# Patient Record
Sex: FEMALE | Race: WHITE | NOT HISPANIC OR LATINO | Employment: FULL TIME | URBAN - METROPOLITAN AREA
[De-identification: names, ages, dates, MRNs, and addresses within clinical notes are randomized per-mention and may not be internally consistent; named-entity substitution may affect disease eponyms.]

---

## 2020-02-24 ENCOUNTER — TRANSCRIBE ORDERS (OUTPATIENT)
Dept: ADMINISTRATIVE | Facility: HOSPITAL | Age: 40
End: 2020-02-24

## 2022-01-28 ENCOUNTER — TELEPHONE (OUTPATIENT)
Dept: OBGYN CLINIC | Facility: HOSPITAL | Age: 42
End: 2022-01-28

## 2022-01-28 NOTE — TELEPHONE ENCOUNTER
I received a Care Request from patient to schedule for:    Where Does it Hurt? My right hand  Are you considering joint replacement? No   Are you seeking a second opinion? No  If yes, who is your doctor? I lvm to cb to schedule

## 2023-03-31 ENCOUNTER — OFFICE VISIT (OUTPATIENT)
Dept: URGENT CARE | Facility: CLINIC | Age: 43
End: 2023-03-31

## 2023-03-31 ENCOUNTER — HOSPITAL ENCOUNTER (EMERGENCY)
Facility: HOSPITAL | Age: 43
Discharge: HOME | End: 2023-03-31
Attending: EMERGENCY MEDICINE | Admitting: EMERGENCY MEDICINE

## 2023-03-31 VITALS
HEART RATE: 69 BPM | DIASTOLIC BLOOD PRESSURE: 88 MMHG | TEMPERATURE: 97.9 F | SYSTOLIC BLOOD PRESSURE: 116 MMHG | RESPIRATION RATE: 14 BRPM | OXYGEN SATURATION: 98 % | WEIGHT: 183 LBS

## 2023-03-31 VITALS
SYSTOLIC BLOOD PRESSURE: 128 MMHG | RESPIRATION RATE: 16 BRPM | TEMPERATURE: 97.6 F | OXYGEN SATURATION: 99 % | WEIGHT: 183 LBS | BODY MASS INDEX: 28.72 KG/M2 | DIASTOLIC BLOOD PRESSURE: 77 MMHG | HEIGHT: 67 IN | HEART RATE: 57 BPM

## 2023-03-31 DIAGNOSIS — R51.9 HEADACHE: ICD-10-CM

## 2023-03-31 DIAGNOSIS — M54.2 NECK PAIN: Primary | ICD-10-CM

## 2023-03-31 LAB
ALBUMIN SERPL BCP-MCNC: 4.2 G/DL (ref 3.5–5)
ALP SERPL-CCNC: 41 U/L (ref 34–104)
ALT SERPL W P-5'-P-CCNC: 30 U/L (ref 7–52)
ANION GAP SERPL CALCULATED.3IONS-SCNC: 7 MMOL/L (ref 4–13)
AST SERPL W P-5'-P-CCNC: 24 U/L (ref 13–39)
BASOPHILS # BLD AUTO: 0.04 THOUSANDS/ÂΜL (ref 0–0.1)
BASOPHILS NFR BLD AUTO: 1 % (ref 0–1)
BILIRUB SERPL-MCNC: 0.53 MG/DL (ref 0.2–1)
BUN SERPL-MCNC: 15 MG/DL (ref 5–25)
CALCIUM SERPL-MCNC: 8.8 MG/DL (ref 8.4–10.2)
CHLORIDE SERPL-SCNC: 106 MMOL/L (ref 96–108)
CO2 SERPL-SCNC: 23 MMOL/L (ref 21–32)
CREAT SERPL-MCNC: 0.89 MG/DL (ref 0.6–1.3)
EOSINOPHIL # BLD AUTO: 0.24 THOUSAND/ÂΜL (ref 0–0.61)
EOSINOPHIL NFR BLD AUTO: 5 % (ref 0–6)
ERYTHROCYTE [DISTWIDTH] IN BLOOD BY AUTOMATED COUNT: 12.5 % (ref 11.6–15.1)
GFR SERPL CREATININE-BSD FRML MDRD: 80 ML/MIN/1.73SQ M
GLUCOSE SERPL-MCNC: 88 MG/DL (ref 65–140)
HCT VFR BLD AUTO: 38.2 % (ref 34.8–46.1)
HGB BLD-MCNC: 12.6 G/DL (ref 11.5–15.4)
IMM GRANULOCYTES # BLD AUTO: 0.01 THOUSAND/UL (ref 0–0.2)
IMM GRANULOCYTES NFR BLD AUTO: 0 % (ref 0–2)
LYMPHOCYTES # BLD AUTO: 1.96 THOUSANDS/ÂΜL (ref 0.6–4.47)
LYMPHOCYTES NFR BLD AUTO: 38 % (ref 14–44)
MCH RBC QN AUTO: 31.7 PG (ref 26.8–34.3)
MCHC RBC AUTO-ENTMCNC: 33 G/DL (ref 31.4–37.4)
MCV RBC AUTO: 96 FL (ref 82–98)
MONOCYTES # BLD AUTO: 0.44 THOUSAND/ÂΜL (ref 0.17–1.22)
MONOCYTES NFR BLD AUTO: 9 % (ref 4–12)
NEUTROPHILS # BLD AUTO: 2.46 THOUSANDS/ÂΜL (ref 1.85–7.62)
NEUTS SEG NFR BLD AUTO: 47 % (ref 43–75)
NRBC BLD AUTO-RTO: 0 /100 WBCS
PLATELET # BLD AUTO: 218 THOUSANDS/UL (ref 149–390)
PMV BLD AUTO: 10.8 FL (ref 8.9–12.7)
POTASSIUM SERPL-SCNC: 4 MMOL/L (ref 3.5–5.3)
PROT SERPL-MCNC: 6.5 G/DL (ref 6.4–8.4)
RBC # BLD AUTO: 3.98 MILLION/UL (ref 3.81–5.12)
SODIUM SERPL-SCNC: 136 MMOL/L (ref 135–147)
WBC # BLD AUTO: 5.15 THOUSAND/UL (ref 4.31–10.16)

## 2023-03-31 RX ORDER — METOCLOPRAMIDE HYDROCHLORIDE 5 MG/ML
10 INJECTION INTRAMUSCULAR; INTRAVENOUS ONCE
Status: COMPLETED | OUTPATIENT
Start: 2023-03-31 | End: 2023-03-31

## 2023-03-31 RX ORDER — ACETAMINOPHEN 325 MG/1
975 TABLET ORAL ONCE
Status: COMPLETED | OUTPATIENT
Start: 2023-03-31 | End: 2023-03-31

## 2023-03-31 RX ORDER — KETOROLAC TROMETHAMINE 30 MG/ML
15 INJECTION, SOLUTION INTRAMUSCULAR; INTRAVENOUS ONCE
Status: DISCONTINUED | OUTPATIENT
Start: 2023-03-31 | End: 2023-03-31 | Stop reason: HOSPADM

## 2023-03-31 RX ORDER — METHOCARBAMOL 500 MG/1
500 TABLET, FILM COATED ORAL ONCE
Status: DISCONTINUED | OUTPATIENT
Start: 2023-03-31 | End: 2023-03-31 | Stop reason: HOSPADM

## 2023-03-31 RX ADMIN — METOCLOPRAMIDE 10 MG: 5 INJECTION, SOLUTION INTRAMUSCULAR; INTRAVENOUS at 18:26

## 2023-03-31 RX ADMIN — ACETAMINOPHEN 975 MG: 325 TABLET, FILM COATED ORAL at 18:26

## 2023-03-31 NOTE — PROGRESS NOTES
[address] [facility]        NAME: Ermias Chin is a 43 y o  female  : 1980    MRN: [id number]  DATE: 2023  TIME: 5:37 PM    Assessment and Plan   Neck pain [M54 2]  1  Neck pain  Transfer to other facility            Patient Instructions   Neck Pain: There is concern as the patient states that her headache and neck pain is an 8/10 in intensity and is unable to move the neck without severe pain  We discussed that she should go to the ED to r/o possible meningitis vs SAH  We discussed this may also be secondary to migraine headache/tension headache vs muscle tension  She is agreeable to this plan and her close friend will drive her to [email or web address]  Follow up with PCP in 3-5 days  Proceed to  ER if symptoms worsen  Chief Complaint     Chief Complaint   Patient presents with   • Headache     Pt presents with headache, stiff neck, nausea; History of Present Illness       The patient is a 70-year-old female who presents today for headache, neck pain, shoulder stiffness and nausea x 3 days  The patient states that her sx began with neck pain/neck tension and shoulder stiffness that has progressed to headache today  She states that the headache has been progressively worsening  She states that the pain in her head and neck is so severe that she is experiencing nausea  She states that the pain is a 8/10  She states that the pain is constant and is a throbbing/aching pain  She states that she has been taking sudafed, Excedrin migraine with no relief  She denies fever, chills  No vomiting  No coughing, sore throat, congestion  No dizziness or weakness  No visual changes  No photophobia/phonophobia  No numbness, weakness or tingling of her upper extremities  No trauma or injury  No confusion, slurred speech, facial drooping  No sick contacts or recent travel  She has never experienced a headache like this before             Review of Systems   Review of Systems   Constitutional: Negative for activity change, appetite change, chills, diaphoresis, fatigue and fever  HENT: Negative for congestion, ear discharge, ear pain, facial swelling, hearing loss, postnasal drip, rhinorrhea, sinus pressure, sinus pain, sore throat, tinnitus, trouble swallowing and voice change  Eyes: Negative for photophobia and visual disturbance  Respiratory: Negative for apnea, cough, chest tightness, shortness of breath, wheezing and stridor  Cardiovascular: Negative for chest pain, palpitations and leg swelling  Gastrointestinal: Positive for nausea  Negative for abdominal distention, abdominal pain, diarrhea and vomiting  Genitourinary: Negative for decreased urine volume  Musculoskeletal: Positive for neck pain  Negative for arthralgias, joint swelling, myalgias and neck stiffness  Skin: Negative for rash  Allergic/Immunologic: Negative for immunocompromised state  Neurological: Positive for headaches  Negative for dizziness, weakness, light-headedness and numbness  Hematological: Negative for adenopathy  Psychiatric/Behavioral: Negative for confusion  Current Medications     No current outpatient medications on file  Current Allergies     Allergies as of 03/31/2023   • (No Known Allergies)            The following portions of the patient's history were reviewed and updated as appropriate: allergies, current medications, past family history, past medical history, past social history, past surgical history and problem list      Past Medical History:   Diagnosis Date   • Autonomic neuropathy        Past Surgical History:   Procedure Laterality Date   • CHOLECYSTECTOMY     • OVARIAN CYST REMOVAL         History reviewed  No pertinent family history  Medications have been verified  Objective   /88   Pulse 69   Temp 97 9 °F (36 6 °C)   Resp 14   Wt 83 kg (183 lb)   LMP 03/31/2023   SpO2 98%   Patient's last menstrual period was 03/31/2023         Physical Exam Physical Exam  Vitals and nursing note reviewed  Constitutional:       General: She is not in acute distress  Appearance: She is well-developed  She is not diaphoretic  HENT:      Head: Normocephalic and atraumatic  Right Ear: Hearing, tympanic membrane, ear canal and external ear normal       Left Ear: Hearing, tympanic membrane, ear canal and external ear normal       Nose: Nose normal  No mucosal edema or rhinorrhea  Right Sinus: No maxillary sinus tenderness or frontal sinus tenderness  Left Sinus: No maxillary sinus tenderness or frontal sinus tenderness  Mouth/Throat:      Pharynx: Uvula midline  No oropharyngeal exudate, posterior oropharyngeal erythema or uvula swelling  Tonsils: No tonsillar abscesses  Eyes:      General: Lids are normal  Vision grossly intact  Gaze aligned appropriately  Extraocular Movements: Extraocular movements intact  Right eye: Normal extraocular motion and no nystagmus  Left eye: Normal extraocular motion and no nystagmus  Conjunctiva/sclera: Conjunctivae normal       Pupils: Pupils are equal, round, and reactive to light  Pupils are equal    Neck:      Meningeal: Brudzinski's sign and Kernig's sign absent  Comments: Cervical spine: There is bilateral paraspinal muscle tenderness and spinal tenderness on exam today  She is unable to flex her neck down to her chest and is tearful when she attempts to move the neck in any capacity  There is no crepitus or edema/erythema  Cardiovascular:      Rate and Rhythm: Normal rate and regular rhythm  Heart sounds: Normal heart sounds, S1 normal and S2 normal  Heart sounds not distant  No murmur heard  No friction rub  No gallop  No S3 or S4 sounds  Pulmonary:      Effort: Pulmonary effort is normal  No tachypnea, bradypnea, accessory muscle usage or respiratory distress  Breath sounds: Normal breath sounds and air entry   No decreased breath sounds, wheezing, rhonchi or rales  Musculoskeletal:      Cervical back: Rigidity present  No edema, erythema, signs of trauma or crepitus  Pain with movement, spinous process tenderness and muscular tenderness present  Decreased range of motion  Lymphadenopathy:      Cervical: No cervical adenopathy  Neurological:      General: No focal deficit present  Mental Status: She is alert and oriented to person, place, and time  Cranial Nerves: Cranial nerves 2-12 are intact  No cranial nerve deficit or facial asymmetry  Sensory: Sensation is intact  Motor: Motor function is intact  No weakness  Coordination: Coordination is intact  Gait: Gait is intact  Gait normal       Deep Tendon Reflexes: Reflexes are normal and symmetric     Psychiatric:         Behavior: Behavior normal

## 2023-03-31 NOTE — ED PROVIDER NOTES
History  Chief Complaint   Patient presents with   • Neck Pain   • Headache     Pt states she woke up with headache and  neck stiffness this am and a dry throat   Took motrin with no relief no fevers  Went to primary doctor and they sent here to rule out meningitis  HPI  Patient is a 42-year-old female presenting for evaluation of headache, neck pain, neck and shoulder stiffness, bilateral leg myalgias  Patient states that she has felt a bit tense over the course of the past few days however states when she woke up she had a headache which is moderate eventually and has progressively worsened and is now severe  Patient states some associated nausea, denies vomiting  Patient denies photophobia, phonophobia, focal weakness or numbness  Patient denies history of trauma  Patient denies confusion  Patient denies fevers, chills, rigors, constitutional symptoms  Patient denies any recent travel or sick contacts  Patient denies history of migraines  None       Past Medical History:   Diagnosis Date   • Autonomic neuropathy        Past Surgical History:   Procedure Laterality Date   • CHOLECYSTECTOMY     • OVARIAN CYST REMOVAL         History reviewed  No pertinent family history  I have reviewed and agree with the history as documented  E-Cigarette/Vaping   • E-Cigarette Use Never User      E-Cigarette/Vaping Substances     Social History     Tobacco Use   • Smoking status: Former     Types: Cigarettes     Passive exposure: Past   • Smokeless tobacco: Never   Vaping Use   • Vaping Use: Never used   Substance Use Topics   • Alcohol use: No   • Drug use: No       Review of Systems   Constitutional: Negative for chills and fever  HENT: Negative for sore throat and trouble swallowing  Gastrointestinal: Negative for nausea and vomiting  Musculoskeletal: Positive for neck stiffness  Negative for arthralgias and myalgias  Neurological: Positive for headaches         Physical Exam  Physical Exam  Vitals reviewed  Constitutional:       General: She is not in acute distress  Appearance: She is well-developed  She is not diaphoretic  Comments: Anxious appearing but nontoxic nondistressed   HENT:      Head: Normocephalic and atraumatic  Comments: Patient with tension in her trapezius and cervical region however is able to turn head to the left into the right and touch her chin to her chest with pain  Right Ear: External ear normal       Left Ear: External ear normal       Nose: Nose normal       Mouth/Throat:      Pharynx: No oropharyngeal exudate  Eyes:      Pupils: Pupils are equal, round, and reactive to light  Cardiovascular:      Rate and Rhythm: Normal rate and regular rhythm  Heart sounds: Normal heart sounds  No murmur heard  No friction rub  No gallop  Comments: Regular rate and rhythm, no murmurs rubs or gallops  Extremities warm and well-perfused without mottling  Pulmonary:      Effort: Pulmonary effort is normal  No respiratory distress  Breath sounds: Normal breath sounds  No wheezing  Comments: No increased work of breathing  Speaking complete sentences  Lungs clear to auscultation bilaterally without wheezes, rales, rhonchi  Chest:      Chest wall: No tenderness  Abdominal:      General: Bowel sounds are normal  There is no distension  Palpations: Abdomen is soft  There is no mass  Tenderness: There is no abdominal tenderness  There is no guarding  Musculoskeletal:         General: No deformity  Normal range of motion  Cervical back: Normal range of motion  Lymphadenopathy:      Cervical: No cervical adenopathy  Skin:     General: Skin is warm and dry  Capillary Refill: Capillary refill takes less than 2 seconds  Neurological:      Mental Status: She is alert and oriented to person, place, and time  Comments: AAOx3  Cranial nerves II through XII intact    Full strength and sensation bilaterally in upper and lower extremities           Vital Signs  ED Triage Vitals [03/31/23 1742]   Temperature Pulse Respirations Blood Pressure SpO2   97 6 °F (36 4 °C) 57 16 128/77 99 %      Temp Source Heart Rate Source Patient Position - Orthostatic VS BP Location FiO2 (%)   Tympanic -- -- -- --      Pain Score       8           Vitals:    03/31/23 1742   BP: 128/77   Pulse: 57         Visual Acuity  Visual Acuity    Flowsheet Row Most Recent Value   L Pupil Size (mm) 2   R Pupil Size (mm) 2          ED Medications  Medications   metoclopramide (REGLAN) injection 10 mg (10 mg Intravenous Given 3/31/23 1826)   acetaminophen (TYLENOL) tablet 975 mg (975 mg Oral Given 3/31/23 1826)       Diagnostic Studies  Results Reviewed     Procedure Component Value Units Date/Time    Comprehensive metabolic panel [[id number]] Collected: 03/31/23 1825    Lab Status: Final result Specimen: Blood from Arm, Right Updated: 03/31/23 1854     Sodium 136 mmol/L      Potassium 4 0 mmol/L      Chloride 106 mmol/L      CO2 23 mmol/L      ANION GAP 7 mmol/L      BUN 15 mg/dL      Creatinine 0 89 mg/dL      Glucose 88 mg/dL      Calcium 8 8 mg/dL      AST 24 U/L      ALT 30 U/L      Alkaline Phosphatase 41 U/L      Total Protein 6 5 g/dL      Albumin 4 2 g/dL      Total Bilirubin 0 53 mg/dL      eGFR 80 ml/min/1 73sq m     Narrative:      [place] guidelines for Chronic Kidney Disease (CKD):   •  Stage 1 with normal or high GFR (GFR > 90 mL/min/1 73 square meters)  •  Stage 2 Mild CKD (GFR = 60-89 mL/min/1 73 square meters)  •  Stage 3A Moderate CKD (GFR = 45-59 mL/min/1 73 square meters)  •  Stage 3B Moderate CKD (GFR = 30-44 mL/min/1 73 square meters)  •  Stage 4 Severe CKD (GFR = 15-29 mL/min/1 73 square meters)  •  Stage 5 End Stage CKD (GFR <15 mL/min/1 73 square meters)  Note: GFR calculation is accurate only with a steady state creatinine    CBC and differential [[id number]] Collected: 03/31/23 1825    Lab Status: Final result Specimen: Blood from Arm, Right Updated: 03/31/23 1839     WBC 5 15 Thousand/uL      RBC 3 98 Million/uL      Hemoglobin 12 6 g/dL      Hematocrit 38 2 %      MCV 96 fL      MCH 31 7 pg      MCHC 33 0 g/dL      RDW 12 5 %      MPV 10 8 fL      Platelets 919 Thousands/uL      nRBC 0 /100 WBCs      Neutrophils Relative 47 %      Immat GRANS % 0 %      Lymphocytes Relative 38 %      Monocytes Relative 9 %      Eosinophils Relative 5 %      Basophils Relative 1 %      Neutrophils Absolute 2 46 Thousands/µL      Immature Grans Absolute 0 01 Thousand/uL      Lymphocytes Absolute 1 96 Thousands/µL      Monocytes Absolute 0 44 Thousand/µL      Eosinophils Absolute 0 24 Thousand/µL      Basophils Absolute 0 04 Thousands/µL                  No orders to display              Procedures  Procedures         ED Course                               SBIRT 22yo+    Flowsheet Row Most Recent Value   SBIRT (25 yo +)    In order to provide better care to our patients, we are screening all of our patients for alcohol and drug use  Would it be okay to ask you these screening questions? No Filed at: 03/31/2023 1827                    Medical Decision Making  I obtained history from the patient  I reviewed external medical documentation including an urgent care note referring the patient to the emergency department for evaluation of meningitis  Patient well-appearing with no infectious symptoms and a nonfocal neuro exam however does have some neck tightness  Discussed differential diagnosis with patient including viral meningitis, bacterial meningitis, subarachnoid hemorrhage with meningeal irritation  Informed patient that she would likely require a lumbar puncture to fully evaluate these conditions and that imaging and labs alone would not be sufficient to rule this out  Patient understanding this however stating that she does not want a lumbar puncture and understands the risks of leaving without this performed    Patient alert, oriented, "consentable, and understanding risks including permanent disability, permanent pain, death  Patient's partner at bedside for this conversation and both understanding this  Patient electing to leave against medical advice  Patient urged to return to the emergency department if she has any continued or worsening symptoms  Amount and/or Complexity of Data Reviewed  Labs: ordered  Risk  OTC drugs  Prescription drug management  Disposition  Final diagnoses:   Neck pain   Headache     Time reflects when diagnosis was documented in both MDM as applicable and the Disposition within this note     Time User Action Codes Description Comment    3/31/2023  6:51 PM Fernando Dorsey Add [M54 2] Neck pain     3/31/2023  6:51 PM Fernando Dorsey Add [R51 9] Headache       ED Disposition     ED Disposition   AMA    Condition   --    Date/Time   Fri Mar 31, 2023  6:52 PM    Comment   Date: 3/31/2023  Patient: Cliff Singh  Admitted: 3/31/2023  5:45 PM  Attending Provider: David Zamarripa MD    Cliff Singh or her authorized caregiver has made the decision for the patient to leave the emergency department against the ad vice of the emergency department staff  She or her authorized caregiver has been informed and understands the inherent risks, including death, permanent neurologic damage, chronic pain, disability  She or her authorized caregiver has decided to acce pt the responsibility for this decision  Cliff Singh and all necessary parties have been advised that she may return for further evaluation or treatment  Her condition at time of discharge was good  Cliff Singh had current vital signs as fol lows:  /77   Pulse 57   Temp 97 6 °F (36 4 °C) (Tympanic)   Resp 16   Ht 5' 7\" (1 702 m)   Wt 83 kg (183 lb)   LMP 03/31/2023            Follow-up Information    None         There are no discharge medications for this patient  No discharge procedures on file      PDMP " Review     None          ED Provider  Electronically Signed by           Ruth Yun MD  03/31/23 0423

## 2023-05-11 ENCOUNTER — OFFICE VISIT (OUTPATIENT)
Dept: FAMILY MEDICINE CLINIC | Facility: CLINIC | Age: 43
End: 2023-05-11

## 2023-05-11 VITALS
BODY MASS INDEX: 27.28 KG/M2 | SYSTOLIC BLOOD PRESSURE: 100 MMHG | RESPIRATION RATE: 16 BRPM | WEIGHT: 173.8 LBS | TEMPERATURE: 97.1 F | OXYGEN SATURATION: 97 % | DIASTOLIC BLOOD PRESSURE: 70 MMHG | HEIGHT: 67 IN | HEART RATE: 66 BPM

## 2023-05-11 DIAGNOSIS — F41.1 GAD (GENERALIZED ANXIETY DISORDER): ICD-10-CM

## 2023-05-11 DIAGNOSIS — Z12.31 ENCOUNTER FOR SCREENING MAMMOGRAM FOR MALIGNANT NEOPLASM OF BREAST: ICD-10-CM

## 2023-05-11 DIAGNOSIS — F33.9 RECURRENT DEPRESSION (HCC): Primary | ICD-10-CM

## 2023-05-11 DIAGNOSIS — Z12.4 SCREENING FOR CERVICAL CANCER: ICD-10-CM

## 2023-05-11 PROBLEM — Z76.89 ESTABLISHING CARE WITH NEW DOCTOR, ENCOUNTER FOR: Status: RESOLVED | Noted: 2022-12-08 | Resolved: 2023-05-11

## 2023-05-11 PROBLEM — M54.2 NECK PAIN: Status: RESOLVED | Noted: 2023-03-31 | Resolved: 2023-05-11

## 2023-05-11 PROBLEM — Z76.89 ESTABLISHING CARE WITH NEW DOCTOR, ENCOUNTER FOR: Status: ACTIVE | Noted: 2022-12-08

## 2023-05-11 RX ORDER — BUSPIRONE HYDROCHLORIDE 5 MG/1
5 TABLET ORAL 2 TIMES DAILY
Qty: 60 TABLET | Refills: 0 | Status: SHIPPED | OUTPATIENT
Start: 2023-05-11 | End: 2023-06-10

## 2023-05-11 RX ORDER — BUPROPION HYDROCHLORIDE 150 MG/1
150 TABLET ORAL EVERY MORNING
Qty: 30 TABLET | Refills: 0 | Status: SHIPPED | OUTPATIENT
Start: 2023-05-11 | End: 2023-06-10

## 2023-05-11 NOTE — ASSESSMENT & PLAN NOTE
She has had depression/anxiety for years  Has tried SSRIs, Wellbutrin, Buspar, but never took medications long enough to notice any benefit  She would like to try again and actually stick with it  She has been trying to lose weight, so would like to go with Wellbutrin at this time  Restart Wellbutrin and Buspar  Referrals provided to psych and therapy     F/u in 1 month

## 2023-05-11 NOTE — PROGRESS NOTES
Name: Coley Phalen      : 1980      MRN: [id number]  Encounter Provider: Saba Christopher MD  Encounter Date: 2023   Encounter department: [address] [place] [address]     1  Recurrent depression ([facility] [address] )  Assessment & Plan:  She has had depression/anxiety for years  Has tried SSRIs, Wellbutrin, Buspar, but never took medications long enough to notice any benefit  She would like to try again and actually stick with it  She has been trying to lose weight, so would like to go with Wellbutrin at this time  Restart Wellbutrin and Buspar  Referrals provided to psych and therapy  F/u in 1 month     Orders:  -     buPROPion (WELLBUTRIN XL) 150 mg 24 hr tablet; Take 1 tablet (150 mg total) by mouth every morning  -     [facility]; Future  -     Ambulatory Referral to Psychiatry; Future    2  Screening for cervical cancer  -     Ambulatory referral to Obstetrics / Gynecology; Future    3  Encounter for screening mammogram for malignant neoplasm of breast  -     Mammo screening bilateral w 3d & cad; Future; Expected date: 2023    4  ERI (generalized anxiety disorder)  Assessment & Plan:  She has had depression/anxiety for years  Has tried SSRIs, Wellbutrin, Buspar, but never took medications long enough to notice any benefit  She would like to try again and actually stick with it  She has been trying to lose weight, so would like to go with Wellbutrin at this time  Restart Wellbutrin and Buspar  Referrals provided to psych and therapy  F/u in 1 month     Orders:  -     buPROPion (WELLBUTRIN XL) 150 mg 24 hr tablet; Take 1 tablet (150 mg total) by mouth every morning  -     busPIRone (BUSPAR) 5 mg tablet; Take 1 tablet (5 mg total) by mouth 2 (two) times a day  -     [facility]; Future  -     Ambulatory Referral to Psychiatry; Future    BMI Counseling: Body mass index is 27 22 kg/m²  Discussed with patient's BMI with her  The BMI is above normal  Nutrition recommendations include reducing portion sizes, decreasing overall calorie intake, 3-5 servings of fruits/vegetables daily, reducing fast food intake and consuming healthier snacks  Exercise recommendations include moderate aerobic physical activity for 150 minutes/week  Subjective      HPI   She is here as a new patient to establish care and to discuss depression and anxiety  PHQ-2/9 Depression Screening    Little interest or pleasure in doing things: 3 - nearly every day  Feeling down, depressed, or hopeless: 3 - nearly every day  Trouble falling or staying asleep, or sleeping too much: 3 - nearly every day  Feeling tired or having little energy: 3 - nearly every day  Poor appetite or overeatin - not at all  Feeling bad about yourself - or that you are a failure or have let yourself or your family down: 2 - more than half the days  Trouble concentrating on things, such as reading the newspaper or watching television: 3 - nearly every day  Moving or speaking so slowly that other people could have noticed  Or the opposite - being so fidgety or restless that you have been moving around a lot more than usual: 0 - not at all  Thoughts that you would be better off dead, or of hurting yourself in some way: 0 - not at all  PHQ-2 Score: 6  PHQ-2 Interpretation: POSITIVE depression screen  PHQ-9 Score: 17   PHQ-9 Interpretation: Moderately severe depression        ERI-7 Flowsheet Screening    Flowsheet Row Most Recent Value   Over the last 2 weeks, how often have you been bothered by any of the following problems?     Feeling nervous, anxious, or on edge 3   Not being able to stop or control worrying 3   Worrying too much about different things 3   Trouble relaxing 3   Being so restless that it is hard to sit still 0   Becoming easily annoyed or irritable 1   Feeling afraid as if something awful might happen 2   ERI-7 Total Score 15        Review of Systems "  Constitutional: Negative  HENT: Negative  Eyes: Negative  Respiratory: Negative  Cardiovascular: Negative  Gastrointestinal: Negative  Endocrine: Negative  Genitourinary: Negative  Musculoskeletal: Negative  Skin: Negative  Allergic/Immunologic: Negative  Neurological: Negative  Hematological: Negative  Psychiatric/Behavioral: Positive for decreased concentration, dysphoric mood and sleep disturbance  Negative for self-injury and suicidal ideas  The patient is nervous/anxious  No current outpatient medications on file prior to visit  Objective     /70   Pulse 66   Temp (!) 97 1 °F (36 2 °C) (Tympanic)   Resp 16   Ht 5' 7\" (1 702 m)   Wt 78 8 kg (173 lb 12 8 oz)   LMP 04/27/2023 (Approximate)   SpO2 97%   BMI 27 22 kg/m²     Physical Exam  Constitutional:       General: She is not in acute distress  Appearance: She is well-developed  She is not diaphoretic  HENT:      Head: Normocephalic and atraumatic  Right Ear: Tympanic membrane, ear canal and external ear normal  There is no impacted cerumen  Left Ear: Tympanic membrane, ear canal and external ear normal  There is no impacted cerumen  Nose: Nose normal  No congestion or rhinorrhea  Mouth/Throat:      Mouth: Mucous membranes are moist       Pharynx: Oropharynx is clear  No oropharyngeal exudate or posterior oropharyngeal erythema  Eyes:      General: No scleral icterus  Right eye: No discharge  Left eye: No discharge  Conjunctiva/sclera: Conjunctivae normal    Cardiovascular:      Rate and Rhythm: Normal rate and regular rhythm  Heart sounds: Normal heart sounds  No murmur heard  No friction rub  No gallop  Pulmonary:      Effort: Pulmonary effort is normal  No respiratory distress  Breath sounds: Normal breath sounds  No wheezing or rales  Chest:      Chest wall: No tenderness  Musculoskeletal:         General: No deformity   " Normal range of motion  Cervical back: Normal range of motion and neck supple  Skin:     General: Skin is warm and dry  Neurological:      Mental Status: She is alert and oriented to person, place, and time  Psychiatric:         Behavior: Behavior normal          Thought Content:  Thought content normal          Judgment: Judgment normal        Lisseth Minaya MD

## 2023-05-16 ENCOUNTER — TELEPHONE (OUTPATIENT)
Dept: PSYCHIATRY | Facility: CLINIC | Age: 43
End: 2023-05-16

## 2023-05-16 NOTE — TELEPHONE ENCOUNTER
Called and lvm asking patient to call our office    We have received a referral from her PCP for therapy and would like to schedule an apt

## 2023-05-22 ENCOUNTER — TELEPHONE (OUTPATIENT)
Dept: PSYCHIATRY | Facility: CLINIC | Age: 43
End: 2023-05-22

## 2023-06-01 DIAGNOSIS — F41.1 GAD (GENERALIZED ANXIETY DISORDER): ICD-10-CM

## 2023-06-01 DIAGNOSIS — F33.9 RECURRENT DEPRESSION (HCC): ICD-10-CM

## 2023-06-01 RX ORDER — BUSPIRONE HYDROCHLORIDE 5 MG/1
TABLET ORAL
Qty: 60 TABLET | Refills: 0 | Status: SHIPPED | OUTPATIENT
Start: 2023-06-01

## 2023-06-01 RX ORDER — BUPROPION HYDROCHLORIDE 150 MG/1
TABLET ORAL
Qty: 30 TABLET | Refills: 0 | Status: SHIPPED | OUTPATIENT
Start: 2023-06-01

## 2023-07-02 DIAGNOSIS — F41.1 GAD (GENERALIZED ANXIETY DISORDER): ICD-10-CM

## 2023-07-02 DIAGNOSIS — F33.9 RECURRENT DEPRESSION (HCC): ICD-10-CM

## 2023-07-03 RX ORDER — BUPROPION HYDROCHLORIDE 150 MG/1
TABLET ORAL
Qty: 30 TABLET | Refills: 0 | Status: SHIPPED | OUTPATIENT
Start: 2023-07-03

## 2023-07-04 DIAGNOSIS — F41.1 GAD (GENERALIZED ANXIETY DISORDER): ICD-10-CM

## 2023-07-05 RX ORDER — BUSPIRONE HYDROCHLORIDE 5 MG/1
TABLET ORAL
Qty: 60 TABLET | Refills: 0 | Status: SHIPPED | OUTPATIENT
Start: 2023-07-05

## 2023-08-08 DIAGNOSIS — F41.1 GAD (GENERALIZED ANXIETY DISORDER): ICD-10-CM

## 2023-08-08 DIAGNOSIS — F33.9 RECURRENT DEPRESSION (HCC): ICD-10-CM

## 2023-08-08 RX ORDER — BUPROPION HYDROCHLORIDE 150 MG/1
TABLET ORAL
Qty: 30 TABLET | Refills: 0 | Status: SHIPPED | OUTPATIENT
Start: 2023-08-08

## 2023-08-08 RX ORDER — BUSPIRONE HYDROCHLORIDE 5 MG/1
TABLET ORAL
Qty: 60 TABLET | Refills: 0 | Status: SHIPPED | OUTPATIENT
Start: 2023-08-08

## 2023-09-13 ENCOUNTER — OFFICE VISIT (OUTPATIENT)
Dept: FAMILY MEDICINE CLINIC | Facility: CLINIC | Age: 43
End: 2023-09-13
Payer: COMMERCIAL

## 2023-09-13 VITALS
DIASTOLIC BLOOD PRESSURE: 80 MMHG | TEMPERATURE: 96.8 F | HEIGHT: 67 IN | SYSTOLIC BLOOD PRESSURE: 118 MMHG | WEIGHT: 141.2 LBS | HEART RATE: 72 BPM | BODY MASS INDEX: 22.16 KG/M2 | RESPIRATION RATE: 17 BRPM

## 2023-09-13 DIAGNOSIS — F41.1 GAD (GENERALIZED ANXIETY DISORDER): Primary | ICD-10-CM

## 2023-09-13 DIAGNOSIS — F33.9 RECURRENT DEPRESSION (HCC): ICD-10-CM

## 2023-09-13 PROCEDURE — 99214 OFFICE O/P EST MOD 30 MIN: CPT | Performed by: FAMILY MEDICINE

## 2023-09-13 RX ORDER — PAROXETINE HYDROCHLORIDE 20 MG/1
20 TABLET, FILM COATED ORAL DAILY
Qty: 30 TABLET | Refills: 1 | Status: SHIPPED | OUTPATIENT
Start: 2023-09-13

## 2023-09-13 RX ORDER — HYDROXYZINE HYDROCHLORIDE 25 MG/1
25 TABLET, FILM COATED ORAL EVERY 6 HOURS PRN
Qty: 30 TABLET | Refills: 0 | Status: SHIPPED | OUTPATIENT
Start: 2023-09-13

## 2023-09-13 RX ORDER — BUSPIRONE HYDROCHLORIDE 10 MG/1
10 TABLET ORAL 3 TIMES DAILY
Qty: 90 TABLET | Refills: 1 | Status: SHIPPED | OUTPATIENT
Start: 2023-09-13 | End: 2023-11-12

## 2023-09-13 NOTE — PROGRESS NOTES
Name: Sammy Huddleston      : 1980      MRN: [id number]  Encounter Provider: Nikki Christine MD  Encounter Date: 2023   Encounter department: [facility]     1. ERI (generalized anxiety disorder)  -     PARoxetine (PAXIL) 20 mg tablet; Take 1 tablet (20 mg total) by mouth daily  -     busPIRone (BUSPAR) 10 mg tablet; Take 1 tablet (10 mg total) by mouth 3 (three) times a day  -     hydrOXYzine HCL (ATARAX) 25 mg tablet; Take 1 tablet (25 mg total) by mouth every 6 (six) hours as needed for anxiety    2. Recurrent depression (HCC)  -     PARoxetine (PAXIL) 20 mg tablet; Take 1 tablet (20 mg total) by mouth daily    She has had depression/anxiety for years. Has tried SSRIs, Wellbutrin, Buspar, but never took medications long enough to notice any benefit. Most recently however was on Wellbutrin and Buspar which she did take for 2 months, but did not work. She would like to try something different. Both her parents have used Paxil which has helped them. Will start Paxil today and increase dose of Buspar to 10mg TID. Atarax as needed for panic attacks. Subjective      HPI   She is here today for follow up of depression and anxiety. ERI-7 Flowsheet Screening    Flowsheet Row Most Recent Value   Over the last 2 weeks, how often have you been bothered by any of the following problems?     Feeling nervous, anxious, or on edge 3   Not being able to stop or control worrying 3   Worrying too much about different things 3   Trouble relaxing 2   Being so restless that it is hard to sit still 0   Becoming easily annoyed or irritable 3   Feeling afraid as if something awful might happen 1   ERI-7 Total Score 15        PHQ-2/9 Depression Screening    Little interest or pleasure in doing things: 3 - nearly every day  Feeling down, depressed, or hopeless: 3 - nearly every day  Trouble falling or staying asleep, or sleeping too much: 2 - more than half the days  Feeling tired or having little energy: 3 - nearly every day  Poor appetite or overeatin - not at all  Feeling bad about yourself - or that you are a failure or have let yourself or your family down: 3 - nearly every day  Trouble concentrating on things, such as reading the newspaper or watching television: 3 - nearly every day  Moving or speaking so slowly that other people could have noticed. Or the opposite - being so fidgety or restless that you have been moving around a lot more than usual: 0 - not at all  Thoughts that you would be better off dead, or of hurting yourself in some way: 0 - not at all  PHQ-9 Score: 17   PHQ-9 Interpretation: Moderately severe depression        Review of Systems   Constitutional: Positive for fatigue. HENT: Negative. Eyes: Negative. Respiratory: Negative. Cardiovascular: Negative. Gastrointestinal: Negative. Endocrine: Negative. Genitourinary: Negative. Musculoskeletal: Negative. Skin: Negative. Allergic/Immunologic: Negative. Neurological: Negative. Hematological: Negative. Psychiatric/Behavioral: Positive for decreased concentration and sleep disturbance. Negative for suicidal ideas. The patient is nervous/anxious. Current Outpatient Medications on File Prior to Visit   Medication Sig   • [DISCONTINUED] buPROPion (WELLBUTRIN XL) 150 mg 24 hr tablet take 1 tablet by mouth every morning   • [DISCONTINUED] busPIRone (BUSPAR) 5 mg tablet take 1 tablet by mouth twice a day       Objective     /80   Pulse 72   Temp (!) 96.8 °F (36 °C)   Resp 17   Ht 5' 7" (1.702 m)   Wt 64 kg (141 lb 3.2 oz)   LMP 2023 (Exact Date)   BMI 22.12 kg/m²     Physical Exam  Constitutional:       General: She is not in acute distress. Appearance: She is well-developed. She is not diaphoretic. HENT:      Head: Normocephalic and atraumatic. Eyes:      General: No scleral icterus. Right eye: No discharge. Left eye: No discharge. Conjunctiva/sclera: Conjunctivae normal.   Pulmonary:      Effort: Pulmonary effort is normal.   Musculoskeletal:      Cervical back: Normal range of motion. Skin:     General: Skin is warm. Neurological:      Mental Status: She is alert and oriented to person, place, and time. Psychiatric:         Behavior: Behavior normal.         Thought Content:  Thought content normal.         Judgment: Judgment normal.       Jareth Uribe MD

## 2023-09-29 ENCOUNTER — OFFICE VISIT (OUTPATIENT)
Dept: URGENT CARE | Facility: CLINIC | Age: 43
End: 2023-09-29
Payer: COMMERCIAL

## 2023-09-29 VITALS
WEIGHT: 140 LBS | HEIGHT: 67 IN | HEART RATE: 79 BPM | SYSTOLIC BLOOD PRESSURE: 112 MMHG | OXYGEN SATURATION: 99 % | BODY MASS INDEX: 21.97 KG/M2 | TEMPERATURE: 98.4 F | RESPIRATION RATE: 16 BRPM | DIASTOLIC BLOOD PRESSURE: 58 MMHG

## 2023-09-29 DIAGNOSIS — H66.002 NON-RECURRENT ACUTE SUPPURATIVE OTITIS MEDIA OF LEFT EAR WITHOUT SPONTANEOUS RUPTURE OF TYMPANIC MEMBRANE: Primary | ICD-10-CM

## 2023-09-29 DIAGNOSIS — J00 ACUTE NASOPHARYNGITIS: ICD-10-CM

## 2023-09-29 PROCEDURE — 99213 OFFICE O/P EST LOW 20 MIN: CPT | Performed by: PHYSICIAN ASSISTANT

## 2023-09-29 RX ORDER — AZITHROMYCIN 250 MG/1
TABLET, FILM COATED ORAL
Qty: 6 TABLET | Refills: 0 | Status: SHIPPED | OUTPATIENT
Start: 2023-09-29 | End: 2023-10-03

## 2023-09-29 NOTE — PROGRESS NOTES
[facility]        NAME: Jaycee Ordonez is a 37 y.o. female  : 1980    MRN: [id number]  DATE: 2023  TIME: 4:51 PM    Assessment and Plan   Non-recurrent acute suppurative otitis media of left ear without spontaneous rupture of tympanic membrane [H66.002]  1. Non-recurrent acute suppurative otitis media of left ear without spontaneous rupture of tympanic membrane  azithromycin (ZITHROMAX) 250 mg tablet      2. Acute nasopharyngitis              Patient Instructions     1. Over-the-counter ibuprofen and/or acetaminophen as needed for pain or fever. 2. Oxymetazoline nasal spray 2 sprays in each nostril every 12 hours for no more than the next 5 days as needed for nasal congestion. 3. Over-the-counter guaifenesin as needed for mucus relief. 4. Gargle salt water as needed for sore throat relief. 5. Increase oral fluid consumption. 6. Follow-up with primary care provider in 7 days for any persistent symptoms. Chief Complaint     Chief Complaint   Patient presents with   • Earache     Left sided ear pain started today. History of Present Illness       80-year-old female patient with a 1 week history of nasal congestion, sore throat, painful swallowing, rhinorrhea. Patient denies any fever or chills. Minimal cough. No GI symptoms. Patient states over the last 2 to 3 days she has been developing worsening left-sided ear pain, decreased hearing. She denies any bleeding or drainage from the ear. No right-sided ear symptoms. Review of Systems   Review of Systems   Constitutional: Negative for chills, fatigue and fever. HENT: Positive for congestion, ear pain, hearing loss, rhinorrhea, sore throat and trouble swallowing. Negative for facial swelling and sinus pain. Respiratory: Negative for cough. Cardiovascular: Negative for chest pain. Gastrointestinal: Negative for abdominal pain. Musculoskeletal: Negative for myalgias. Skin: Negative for rash. Current Medications       Current Outpatient Medications:   •  azithromycin (ZITHROMAX) 250 mg tablet, Take 2 tablets today then 1 tablet daily x 4 days, Disp: 6 tablet, Rfl: 0  •  busPIRone (BUSPAR) 10 mg tablet, Take 1 tablet (10 mg total) by mouth 3 (three) times a day, Disp: 90 tablet, Rfl: 1  •  hydrOXYzine HCL (ATARAX) 25 mg tablet, Take 1 tablet (25 mg total) by mouth every 6 (six) hours as needed for anxiety, Disp: 30 tablet, Rfl: 0  •  PARoxetine (PAXIL) 20 mg tablet, Take 1 tablet (20 mg total) by mouth daily, Disp: 30 tablet, Rfl: 1    Current Allergies     Allergies as of 09/29/2023   • (No Known Allergies)            The following portions of the patient's history were reviewed and updated as appropriate: allergies, current medications, past family history, past medical history, past social history, past surgical history and problem list.     Past Medical History:   Diagnosis Date   • Autonomic neuropathy        Past Surgical History:   Procedure Laterality Date   • CHOLECYSTECTOMY     • OVARIAN CYST REMOVAL         History reviewed. No pertinent family history. Medications have been verified. Objective   /58   Pulse 79   Temp 98.4 °F (36.9 °C)   Resp 16   Ht 5' 7" (1.702 m)   Wt 63.5 kg (140 lb)   LMP 08/26/2023 (Exact Date)   SpO2 99%   BMI 21.93 kg/m²        Physical Exam     Physical Exam  Vitals and nursing note reviewed. Constitutional:       General: She is not in acute distress. Appearance: Normal appearance. She is not ill-appearing or toxic-appearing. HENT:      Head: Normocephalic. Right Ear: Tympanic membrane normal.      Ears:      Comments: Left TM is erythematous, bulging, injected, with a mild to moderate purulent middle ear effusion notable. No perforation seen. Nose: Congestion present.       Mouth/Throat:      Mouth: Mucous membranes are moist.   Eyes:      Conjunctiva/sclera: Conjunctivae normal.      Pupils: Pupils are equal, round, and reactive to light. Cardiovascular:      Rate and Rhythm: Normal rate and regular rhythm. Pulses: Normal pulses. Pulmonary:      Effort: Pulmonary effort is normal.      Breath sounds: Normal breath sounds. Abdominal:      Tenderness: There is no abdominal tenderness. Musculoskeletal:         General: Normal range of motion. Cervical back: Normal range of motion. Skin:     General: Skin is warm and dry. Capillary Refill: Capillary refill takes less than 2 seconds. Neurological:      Mental Status: She is alert and oriented to person, place, and time.    Psychiatric:         Mood and Affect: Mood normal.         Behavior: Behavior normal.

## 2023-10-04 ENCOUNTER — OFFICE VISIT (OUTPATIENT)
Dept: URGENT CARE | Facility: CLINIC | Age: 43
End: 2023-10-04
Payer: COMMERCIAL

## 2023-10-04 VITALS
OXYGEN SATURATION: 99 % | DIASTOLIC BLOOD PRESSURE: 80 MMHG | SYSTOLIC BLOOD PRESSURE: 110 MMHG | WEIGHT: 140 LBS | HEART RATE: 76 BPM | TEMPERATURE: 97.4 F | RESPIRATION RATE: 17 BRPM | HEIGHT: 67 IN | BODY MASS INDEX: 21.97 KG/M2

## 2023-10-04 DIAGNOSIS — H69.92 DYSFUNCTION OF LEFT EUSTACHIAN TUBE: ICD-10-CM

## 2023-10-04 DIAGNOSIS — J00 ACUTE NASOPHARYNGITIS: Primary | ICD-10-CM

## 2023-10-04 PROCEDURE — 99213 OFFICE O/P EST LOW 20 MIN: CPT | Performed by: PHYSICIAN ASSISTANT

## 2023-10-04 RX ORDER — PREDNISONE 20 MG/1
TABLET ORAL
Qty: 15 TABLET | Refills: 0 | Status: SHIPPED | OUTPATIENT
Start: 2023-10-04

## 2023-10-04 NOTE — PROGRESS NOTES
[facility]        NAME: Ky Barger is a 37 y.o. female  : 1980    MRN: [id number]  DATE: 2023  TIME: 3:55 PM    Assessment and Plan   Acute nasopharyngitis [J00]  1. Acute nasopharyngitis        2. Dysfunction of left eustachian tube  predniSONE 20 mg tablet        Based on history and exam I suspect persistent mild left eustachian tube dysfunction and will treat with nasal spray decongestants, eustachian tube exercises, steroids. The rest of the symptoms I suspect are persistent due to ongoing viral URI. Patient Instructions     1. Over-the-counter ibuprofen and/or acetaminophen as needed for pain or fever. 2. Oxymetazoline nasal spray 2 sprays in each nostril every 12 hours for no more than the next 5 days as needed for nasal congestion. 3. Over-the-counter guaifenesin as needed for mucus relief. 4. Gargle salt water as needed for sore throat relief. 5. Increase oral fluid consumption. 6.  Perform eustachian tube exercises as directed frequently but gently throughout the course of the day. 7. Follow-up with primary care provider in 7 days for any persistent symptoms. Chief Complaint     Chief Complaint   Patient presents with   • Sore Throat     Cough, sore throat and bilateral ear congestion since last week. Was at [address] [place] [address] last week for the same complaint. Given Zpack  and able to consumed as prescribed but the symptoms still persist  Tested Negative for covid yesterday using the home kit test         History of Present Illness       80-year-old female patient who presents for follow-up of her visit here on 2023. Patient states that her ear pain has improved but she still unable to normal hearing from the left ear and has intermittent pressure. No bleeding or drainage. Patient also states that she continues with sore throat, mild nasal congestion, mild cough and fatigue. No GI symptoms. No persistent fevers since last visit.   No shortness of breath or chest pain. Review of Systems   Review of Systems   Constitutional: Negative for fever. HENT: Positive for congestion, ear pain and sore throat. Negative for facial swelling, rhinorrhea and sinus pain. Respiratory: Positive for cough. Cardiovascular: Negative for chest pain. Gastrointestinal: Negative for abdominal pain. Musculoskeletal: Negative for myalgias. Skin: Negative for rash. Current Medications       Current Outpatient Medications:   •  busPIRone (BUSPAR) 10 mg tablet, Take 1 tablet (10 mg total) by mouth 3 (three) times a day, Disp: 90 tablet, Rfl: 1  •  hydrOXYzine HCL (ATARAX) 25 mg tablet, Take 1 tablet (25 mg total) by mouth every 6 (six) hours as needed for anxiety, Disp: 30 tablet, Rfl: 0  •  PARoxetine (PAXIL) 20 mg tablet, Take 1 tablet (20 mg total) by mouth daily, Disp: 30 tablet, Rfl: 1  •  predniSONE 20 mg tablet, Take 3 tabs all at once daily for 3 days then 2 tabs for 2 days then 1 tab for 2 days. , Disp: 15 tablet, Rfl: 0    Current Allergies     Allergies as of 10/04/2023   • (No Known Allergies)            The following portions of the patient's history were reviewed and updated as appropriate: allergies, current medications, past family history, past medical history, past social history, past surgical history and problem list.     Past Medical History:   Diagnosis Date   • Autonomic neuropathy        Past Surgical History:   Procedure Laterality Date   • CHOLECYSTECTOMY     • OVARIAN CYST REMOVAL         History reviewed. No pertinent family history. Medications have been verified. Objective   /80   Pulse 76   Temp (!) 97.4 °F (36.3 °C)   Resp 17   Ht 5' 7" (1.702 m)   Wt 63.5 kg (140 lb)   LMP 08/26/2023 (Exact Date)   SpO2 99%   BMI 21.93 kg/m²        Physical Exam     Physical Exam  Vitals and nursing note reviewed. Constitutional:       General: She is not in acute distress. Appearance: Normal appearance.  She is not ill-appearing or toxic-appearing. HENT:      Head: Normocephalic. Right Ear: Tympanic membrane normal.      Ears:      Comments: Left middle ear effusion has resolved. Some mild persistent retraction, duskiness. No perforation seen. Mild injection. Nose: Congestion present. Mouth/Throat:      Mouth: Mucous membranes are moist.      Pharynx: Posterior oropharyngeal erythema present. No pharyngeal swelling or oropharyngeal exudate. Tonsils: No tonsillar exudate. Eyes:      Conjunctiva/sclera: Conjunctivae normal.      Pupils: Pupils are equal, round, and reactive to light. Cardiovascular:      Rate and Rhythm: Normal rate and regular rhythm. Pulses: Normal pulses. Heart sounds: Normal heart sounds. Pulmonary:      Effort: Pulmonary effort is normal.      Breath sounds: Normal breath sounds. Musculoskeletal:      Cervical back: Normal range of motion. Lymphadenopathy:      Cervical: No cervical adenopathy. Skin:     General: Skin is warm and dry. Capillary Refill: Capillary refill takes less than 2 seconds. Neurological:      Mental Status: She is alert and oriented to person, place, and time.    Psychiatric:         Mood and Affect: Mood normal.         Behavior: Behavior normal.

## 2023-10-04 NOTE — PATIENT INSTRUCTIONS
1. Over-the-counter ibuprofen and/or acetaminophen as needed for pain or fever. 2. Oxymetazoline nasal spray 2 sprays in each nostril every 12 hours for no more than the next 5 days as needed for nasal congestion. 3. Over-the-counter guaifenesin as needed for mucus relief. 4. Gargle salt water as needed for sore throat relief. 5. Increase oral fluid consumption. 6.  Perform eustachian tube exercises as directed frequently but gently throughout the course of the day. 7. Follow-up with primary care provider in 7 days for any persistent symptoms.

## 2023-12-05 ENCOUNTER — OFFICE VISIT (OUTPATIENT)
Dept: FAMILY MEDICINE CLINIC | Facility: CLINIC | Age: 43
End: 2023-12-05
Payer: COMMERCIAL

## 2023-12-05 VITALS
WEIGHT: 136 LBS | OXYGEN SATURATION: 98 % | BODY MASS INDEX: 21.35 KG/M2 | HEIGHT: 67 IN | RESPIRATION RATE: 18 BRPM | SYSTOLIC BLOOD PRESSURE: 110 MMHG | HEART RATE: 70 BPM | TEMPERATURE: 98 F | DIASTOLIC BLOOD PRESSURE: 70 MMHG

## 2023-12-05 DIAGNOSIS — I88.9 LYMPHADENITIS: ICD-10-CM

## 2023-12-05 DIAGNOSIS — H10.31 ACUTE CONJUNCTIVITIS OF RIGHT EYE, UNSPECIFIED ACUTE CONJUNCTIVITIS TYPE: Primary | ICD-10-CM

## 2023-12-05 PROCEDURE — 99213 OFFICE O/P EST LOW 20 MIN: CPT | Performed by: FAMILY MEDICINE

## 2023-12-05 RX ORDER — AMOXICILLIN 875 MG/1
875 TABLET, COATED ORAL 2 TIMES DAILY
Qty: 14 TABLET | Refills: 0 | Status: SHIPPED | OUTPATIENT
Start: 2023-12-05 | End: 2023-12-12

## 2023-12-05 RX ORDER — MOXIFLOXACIN 5 MG/ML
1 SOLUTION/ DROPS OPHTHALMIC 3 TIMES DAILY
Qty: 3 ML | Refills: 0 | Status: SHIPPED | OUTPATIENT
Start: 2023-12-05

## 2023-12-05 NOTE — PROGRESS NOTES
Name: Ciarra Jean      : 1980      MRN: [id number]  Encounter Provider: Willy Shepherd DO  Encounter Date: 2023   Encounter department: [facility]     1. Acute conjunctivitis of right eye, unspecified acute conjunctivitis type  -     moxifloxacin (VIGAMOX) 0.5 % ophthalmic solution; Administer 1 drop to the right eye 3 (three) times a day    2. Lymphadenitis  -     amoxicillin (AMOXIL) 875 mg tablet; Take 1 tablet (875 mg total) by mouth 2 (two) times a day for 7 days     Return if symptoms worsen or fail to improve. Subjective      She started with a swollen lymph node in front of her right ear 3 days ago and then with right eye discharge yesterday. Review of Systems   Constitutional:  Negative for fever. HENT:  Positive for sore throat. Negative for sinus pain. Current Outpatient Medications on File Prior to Visit   Medication Sig   • busPIRone (BUSPAR) 10 mg tablet Take 1 tablet (10 mg total) by mouth 3 (three) times a day   • hydrOXYzine HCL (ATARAX) 25 mg tablet Take 1 tablet (25 mg total) by mouth every 6 (six) hours as needed for anxiety   • PARoxetine (PAXIL) 20 mg tablet Take 1 tablet (20 mg total) by mouth daily   • [DISCONTINUED] predniSONE 20 mg tablet Take 3 tabs all at once daily for 3 days then 2 tabs for 2 days then 1 tab for 2 days. (Patient not taking: Reported on 2023)       Objective     /70   Pulse 70   Temp 98 °F (36.7 °C)   Resp 18   Ht 5' 7" (1.702 m)   Wt 61.7 kg (136 lb)   LMP 11/15/2023 (Exact Date)   SpO2 98%   BMI 21.30 kg/m²     Physical Exam  Vitals and nursing note reviewed. Constitutional:       Appearance: She is well-developed. HENT:      Head: Normocephalic and atraumatic. Right Ear: Tympanic membrane and external ear normal.      Left Ear: Tympanic membrane and external ear normal.   Eyes:      General:         Right eye: Discharge present.    Cardiovascular:      Rate and Rhythm: Normal rate and regular rhythm. Heart sounds: Normal heart sounds. No murmur heard. No friction rub. Pulmonary:      Effort: Pulmonary effort is normal. No respiratory distress. Breath sounds: Normal breath sounds. No wheezing or rales. Musculoskeletal:      Right lower leg: No edema. Left lower leg: No edema.      Tender lymph node anterior to right tragus      Charlene Perez, DO

## 2024-01-10 DIAGNOSIS — F41.1 GAD (GENERALIZED ANXIETY DISORDER): ICD-10-CM

## 2024-01-10 RX ORDER — HYDROXYZINE HYDROCHLORIDE 25 MG/1
25 TABLET, FILM COATED ORAL EVERY 6 HOURS PRN
Qty: 30 TABLET | Refills: 0 | Status: SHIPPED | OUTPATIENT
Start: 2024-01-10

## 2024-02-06 ENCOUNTER — OFFICE VISIT (OUTPATIENT)
Dept: OBGYN CLINIC | Facility: CLINIC | Age: 44
End: 2024-02-06
Payer: COMMERCIAL

## 2024-02-06 ENCOUNTER — APPOINTMENT (OUTPATIENT)
Dept: RADIOLOGY | Facility: CLINIC | Age: 44
End: 2024-02-06
Payer: COMMERCIAL

## 2024-02-06 VITALS
SYSTOLIC BLOOD PRESSURE: 114 MMHG | HEART RATE: 65 BPM | DIASTOLIC BLOOD PRESSURE: 78 MMHG | WEIGHT: 136 LBS | HEIGHT: 67 IN | BODY MASS INDEX: 21.35 KG/M2

## 2024-02-06 DIAGNOSIS — M79.642 BILATERAL HAND PAIN: ICD-10-CM

## 2024-02-06 DIAGNOSIS — M79.641 BILATERAL HAND PAIN: Primary | ICD-10-CM

## 2024-02-06 DIAGNOSIS — M79.641 BILATERAL HAND PAIN: ICD-10-CM

## 2024-02-06 DIAGNOSIS — M19.049 CMC ARTHRITIS: ICD-10-CM

## 2024-02-06 DIAGNOSIS — M79.642 BILATERAL HAND PAIN: Primary | ICD-10-CM

## 2024-02-06 PROCEDURE — 73130 X-RAY EXAM OF HAND: CPT

## 2024-02-06 PROCEDURE — 99203 OFFICE O/P NEW LOW 30 MIN: CPT | Performed by: ORTHOPAEDIC SURGERY

## 2024-02-06 RX ORDER — MELOXICAM 15 MG/1
15 TABLET ORAL DAILY
Qty: 30 TABLET | Refills: 0 | Status: SHIPPED | OUTPATIENT
Start: 2024-02-06 | End: 2024-03-07

## 2024-02-06 NOTE — PROGRESS NOTES
Assessment/Plan:  1. Bilateral hand pain  XR hand 3+ vw right    XR hand 3+ vw left    meloxicam (Mobic) 15 mg tablet      2. CMC arthritis  Brace        The patient has some hypermobility of her right thumb CMC joint, and mild OA of the CMC joint. She certainly could have sustained a ligamentous injury at the time of her recent falls. I would like to trial a thumb spica brace for the next month. I also prescribed her Mobic. If she fails to make progress, we discussed possible MRI of the hand. It is also possible that her autonomic neuropathy is playing a role in her symptoms. She will FU in 4 weeks for repeat evaluation.     Subjective:   Radha Humphreys is a 43 y.o. female who presents today for evaluation of her right hand. She notes pain about the right hand for about the past 2 months. She did have a couple falls prior to this, however these seemed relatively mild and her hand did not hurt immediately after her falls. Most of the pain about the radial hand and base of her thumb, although her symptoms can be diffuse about the hand at times. She also notes visible swelling about the thenar muscles at times. She can not identify and specific  She denies any paresthesias into the hands.   The patient does have autonomic neuropathy.       Review of Systems   Constitutional: Negative.  Negative for chills and fever.   HENT: Negative.  Negative for ear pain and sore throat.    Eyes: Negative.  Negative for pain and redness.   Respiratory: Negative.  Negative for shortness of breath and wheezing.    Cardiovascular:  Negative for chest pain and palpitations.   Gastrointestinal: Negative.  Negative for abdominal pain and blood in stool.   Endocrine: Negative.  Negative for polydipsia and polyuria.   Genitourinary: Negative.  Negative for difficulty urinating and dysuria.   Musculoskeletal:         As noted in HPI   Skin: Negative.  Negative for pallor and rash.   Neurological: Negative.  Negative for dizziness and  numbness.   Hematological: Negative.  Negative for adenopathy. Does not bruise/bleed easily.   Psychiatric/Behavioral: Negative.  Negative for confusion and suicidal ideas.          Past Medical History:   Diagnosis Date   • Autonomic neuropathy        Past Surgical History:   Procedure Laterality Date   • CHOLECYSTECTOMY     • OVARIAN CYST REMOVAL         History reviewed. No pertinent family history.    Social History     Occupational History   • Not on file   Tobacco Use   • Smoking status: Never     Passive exposure: Past   • Smokeless tobacco: Never   Vaping Use   • Vaping status: Never Used   Substance and Sexual Activity   • Alcohol use: No   • Drug use: No   • Sexual activity: Not on file         Current Outpatient Medications:   •  hydrOXYzine HCL (ATARAX) 25 mg tablet, Take 1 tablet (25 mg total) by mouth every 6 (six) hours as needed for anxiety, Disp: 30 tablet, Rfl: 0  •  meloxicam (Mobic) 15 mg tablet, Take 1 tablet (15 mg total) by mouth daily, Disp: 30 tablet, Rfl: 0  •  busPIRone (BUSPAR) 10 mg tablet, Take 1 tablet (10 mg total) by mouth 3 (three) times a day, Disp: 90 tablet, Rfl: 1  •  moxifloxacin (VIGAMOX) 0.5 % ophthalmic solution, Administer 1 drop to the right eye 3 (three) times a day (Patient not taking: Reported on 2/6/2024), Disp: 3 mL, Rfl: 0  •  PARoxetine (PAXIL) 20 mg tablet, Take 1 tablet (20 mg total) by mouth daily (Patient not taking: Reported on 2/6/2024), Disp: 30 tablet, Rfl: 1    No Known Allergies    Objective:  Vitals:    02/06/24 1357   BP: 114/78   Pulse: 65     Pain Score:   8      Right Hand Exam     Tenderness   Right hand tenderness location: thumb CMC joint and thenar muscles. Mild ulnar sided hand pain. Hypermobile thumb CMC joint compared to contralateral side.    Range of Motion   The patient has normal right wrist ROM.     Tests   Phalen’s Sign: negative  Tinel's sign (median nerve): negative    Other   Erythema: absent  Sensation: normal  Pulse:  present    Comments:  Carpal bossing. Full ROM of all digits.           Tests     Right Wrist/Hand   Negative Phalen's sign and Tinel's sign (medial nerve).       Physical Exam  Constitutional:       General: She is not in acute distress.     Appearance: She is well-developed.   HENT:      Head: Normocephalic and atraumatic.   Eyes:      General: No scleral icterus.     Conjunctiva/sclera: Conjunctivae normal.   Neck:      Vascular: No JVD.   Cardiovascular:      Rate and Rhythm: Normal rate.   Pulmonary:      Effort: Pulmonary effort is normal. No respiratory distress.   Musculoskeletal:      Comments: As per HPI   Skin:     General: Skin is warm.   Neurological:      Mental Status: She is alert and oriented to person, place, and time.      Coordination: Coordination normal.         I have personally reviewed pertinent films in PACS and my interpretation is as follows:  Xrays bilateral hands: No acute osseous abnormality      This document was created using speech voice recognition software.   Grammatical errors, random word insertions, pronoun errors, and incomplete sentences are an occasional consequence of this system due to software limitations, ambient noise, and hardware issues.   Any formal questions or concerns about content, text, or information contained within the body of this dictation should be directly addressed to the provider for clarification.

## 2024-03-03 DIAGNOSIS — F41.1 GAD (GENERALIZED ANXIETY DISORDER): Primary | ICD-10-CM

## 2024-03-03 RX ORDER — HYDROXYZINE HYDROCHLORIDE 25 MG/1
25 TABLET, FILM COATED ORAL EVERY 6 HOURS PRN
Qty: 30 TABLET | Refills: 5 | Status: SHIPPED | OUTPATIENT
Start: 2024-03-03

## 2024-05-29 ENCOUNTER — APPOINTMENT (OUTPATIENT)
Dept: RADIOLOGY | Facility: CLINIC | Age: 44
End: 2024-05-29
Payer: COMMERCIAL

## 2024-05-29 ENCOUNTER — OFFICE VISIT (OUTPATIENT)
Dept: FAMILY MEDICINE CLINIC | Facility: CLINIC | Age: 44
End: 2024-05-29
Payer: COMMERCIAL

## 2024-05-29 VITALS
TEMPERATURE: 98.6 F | WEIGHT: 127.2 LBS | SYSTOLIC BLOOD PRESSURE: 120 MMHG | DIASTOLIC BLOOD PRESSURE: 68 MMHG | HEIGHT: 67 IN | BODY MASS INDEX: 19.97 KG/M2 | RESPIRATION RATE: 18 BRPM | HEART RATE: 63 BPM

## 2024-05-29 DIAGNOSIS — N64.4 BREAST TENDERNESS IN FEMALE: ICD-10-CM

## 2024-05-29 DIAGNOSIS — L65.9 HAIR LOSS: Primary | ICD-10-CM

## 2024-05-29 DIAGNOSIS — R53.83 OTHER FATIGUE: ICD-10-CM

## 2024-05-29 DIAGNOSIS — R63.4 WEIGHT LOSS, UNINTENTIONAL: ICD-10-CM

## 2024-05-29 DIAGNOSIS — H57.11 PAIN OF RIGHT ORBIT: ICD-10-CM

## 2024-05-29 DIAGNOSIS — R07.89 CHEST WALL TENDERNESS: ICD-10-CM

## 2024-05-29 DIAGNOSIS — M25.50 ARTHRALGIA, UNSPECIFIED JOINT: ICD-10-CM

## 2024-05-29 PROCEDURE — 71046 X-RAY EXAM CHEST 2 VIEWS: CPT

## 2024-05-29 PROCEDURE — 99214 OFFICE O/P EST MOD 30 MIN: CPT | Performed by: NURSE PRACTITIONER

## 2024-05-29 PROCEDURE — 70200 X-RAY EXAM OF EYE SOCKETS: CPT

## 2024-05-29 NOTE — PROGRESS NOTES
Assessment/Plan:  Will do blood work and will do xray chest and will schedule mammogram diagnostic with ultrasound ASAP and if it is negative then will do CT chest abdomen pelvis  1. Hair loss  -     Sedimentation rate, automated; Future  -     Babesia microti antibody, IgG & Igm; Future  -     C-reactive protein; Future  -     TSH, 3rd generation with Free T4 reflex; Future  -     Lyme Total AB W Reflex to IGM/IGG; Future  -     MARIBEL Comprehensive Panel; Future  -     Rheumatoid Arthritis Factor; Future  -     Fe+TIBC+Sánchez; Future  -     Vitamin B12; Future  -     Vitamin D 25 hydroxy; Future  -     Comprehensive metabolic panel; Future; Expected date: 05/29/2024  -     CBC and Platelet; Future  -     Sedimentation rate, automated  -     Babesia microti antibody, IgG & Igm  -     C-reactive protein  -     TSH, 3rd generation with Free T4 reflex  -     Lyme Total AB W Reflex to IGM/IGG  -     MARIBEL Comprehensive Panel  -     Rheumatoid Arthritis Factor  -     Fe+TIBC+Snáchez  -     Vitamin B12  -     Vitamin D 25 hydroxy  -     Comprehensive metabolic panel  -     CBC and Platelet  2. Other fatigue  -     Sedimentation rate, automated; Future  -     Babesia microti antibody, IgG & Igm; Future  -     C-reactive protein; Future  -     TSH, 3rd generation with Free T4 reflex; Future  -     Lyme Total AB W Reflex to IGM/IGG; Future  -     MARIBEL Comprehensive Panel; Future  -     Rheumatoid Arthritis Factor; Future  -     Fe+TIBC+Sánchez; Future  -     Vitamin B12; Future  -     Vitamin D 25 hydroxy; Future  -     Comprehensive metabolic panel; Future; Expected date: 05/29/2024  -     CBC and Platelet; Future  -     Sedimentation rate, automated  -     Babesia microti antibody, IgG & Igm  -     C-reactive protein  -     TSH, 3rd generation with Free T4 reflex  -     Lyme Total AB W Reflex to IGM/IGG  -     MARIBEL Comprehensive Panel  -     Rheumatoid Arthritis Factor  -     Fe+TIBC+Sánchez  -     Vitamin B12  -     Vitamin D 25 hydroxy  -      Comprehensive metabolic panel  -     CBC and Platelet  3. Arthralgia, unspecified joint  -     Sedimentation rate, automated; Future  -     Babesia microti antibody, IgG & Igm; Future  -     C-reactive protein; Future  -     TSH, 3rd generation with Free T4 reflex; Future  -     Lyme Total AB W Reflex to IGM/IGG; Future  -     MARIBEL Comprehensive Panel; Future  -     Rheumatoid Arthritis Factor; Future  -     Fe+TIBC+Sánchez; Future  -     Vitamin B12; Future  -     Vitamin D 25 hydroxy; Future  -     Comprehensive metabolic panel; Future; Expected date: 05/29/2024  -     CBC and Platelet; Future  -     Sedimentation rate, automated  -     Babesia microti antibody, IgG & Igm  -     C-reactive protein  -     TSH, 3rd generation with Free T4 reflex  -     Lyme Total AB W Reflex to IGM/IGG  -     MARIBEL Comprehensive Panel  -     Rheumatoid Arthritis Factor  -     Fe+TIBC+Sánchez  -     Vitamin B12  -     Vitamin D 25 hydroxy  -     Comprehensive metabolic panel  -     CBC and Platelet  4. Pain of right orbit  -     XR orbits 4+ vw; Future; Expected date: 05/29/2024  5. Chest wall tenderness  -     XR chest pa & lateral; Future; Expected date: 05/29/2024  6. Breast tenderness in female  -     Mammo diagnostic bilateral w 3d & cad; Future; Expected date: 05/29/2024  -     US breast right limited (diagnostic); Future; Expected date: 05/29/2024  7. Weight loss, unintentional          Patient Instructions:  Supportive care discussed and advised.  Advised to RTO for any worsening and no improvement.   Follow up for no improvement and worsening of conditions.  Patient advised and educated when to see immediate medical care.    Return if symptoms worsen or fail to improve.      Future Appointments   Date Time Provider Department Center   8/14/2024 10:00 AM AURY [facility]   9/19/2024  1:30 PM [place] [facility] 2 [place] [facility]           Subjective:      Patient ID: Radha Humphreys is a 43 y.o. female.    Chief  "Complaint   Patient presents with   • Chest Swelling     Gets the swelling on the right side Tona Gallego LPN     • Eye Pain   • Headache     Gets headaches from pressure behind eyes   • Neck Swelling   • Mouth Lesions     Gets sores in mouth     Wt Readings from Last 3 Encounters:   24 57.7 kg (127 lb 3.2 oz)   24 61.7 kg (136 lb)   23 61.7 kg (136 lb)        Vitals:  /68   Pulse 63   Temp 98.6 °F (37 °C)   Resp 18   Ht 5' 7\" (1.702 m)   Wt 57.7 kg (127 lb 3.2 oz)   LMP 2024 (Approximate)   BMI 19.92 kg/m²     HPI  Patient stated that started with hair loss couple of months ago and then couple of weeks ago started with pain in corner of eye close to nose  occasionally gets headaches and also feeling swollen at times at front neck and gets mouth sores at times but does not have one present at this time. Stated that having chest swelling upper right breast area from last week and having some discomfort at the area. Denies fever, chills, and sob. Stated that having achy in joints too.  Has weight loss and stated that not trying to loose it. Noticed 9 pounds since 2024            PHQ-2/9 Depression Screening    Little interest or pleasure in doing things: 1 - several days  Feeling down, depressed, or hopeless: 1 - several days  Trouble falling or staying asleep, or sleeping too much: 3 - nearly every day  Feeling tired or having little energy: 1 - several days  Poor appetite or overeatin - not at all  Feeling bad about yourself - or that you are a failure or have let yourself or your family down: 1 - several days  Trouble concentrating on things, such as reading the newspaper or watching television: 1 - several days  Moving or speaking so slowly that other people could have noticed. Or the opposite - being so fidgety or restless that you have been moving around a lot more than usual: 0 - not at all  Thoughts that you would be better off dead, or of hurting yourself in " some way: 0 - not at all  PHQ-9 Score: 8  PHQ-9 Interpretation: Mild depression         Depression Screening Follow-up Plan: Patient's depression screening was positive with a PHQ-2 score of . Their PHQ-9 score was 8. Continue regular follow-up with their psychologist/therapist/psychiatrist who is managing their mental health condition(s).     The following portions of the patient's history were reviewed and updated as appropriate: allergies, current medications, past family history, past medical history, past social history, past surgical history and problem list.      Review of Systems   Constitutional:  Positive for fatigue. Negative for chills, diaphoresis, fever and unexpected weight change.   HENT:  Negative for congestion, dental problem, drooling, ear discharge, ear pain, facial swelling, hearing loss, mouth sores, nosebleeds, postnasal drip, rhinorrhea, sinus pressure, sinus pain, sneezing, sore throat, tinnitus, trouble swallowing and voice change.    Respiratory:  Negative for cough, chest tightness, shortness of breath and wheezing.         As noted in HPI       Cardiovascular: Negative.    Gastrointestinal:  Negative for abdominal pain, constipation, diarrhea, nausea and vomiting.   Musculoskeletal:  Positive for arthralgias.   Skin:         As noted in HPI       Neurological:  Negative for dizziness, light-headedness and headaches.         Objective:    Social History     Tobacco Use   Smoking Status Never   • Passive exposure: Past   Smokeless Tobacco Never       Allergies: No Known Allergies      Current Outpatient Medications   Medication Sig Dispense Refill   • hydrOXYzine HCL (ATARAX) 25 mg tablet Take 1 tablet (25 mg total) by mouth every 6 (six) hours as needed for anxiety 30 tablet 5   • busPIRone (BUSPAR) 10 mg tablet Take 1 tablet (10 mg total) by mouth 3 (three) times a day (Patient not taking: Reported on 5/29/2024) 90 tablet 1   • meloxicam (Mobic) 15 mg tablet Take 1 tablet (15 mg total)  by mouth daily (Patient not taking: Reported on 5/29/2024) 30 tablet 0   • moxifloxacin (VIGAMOX) 0.5 % ophthalmic solution Administer 1 drop to the right eye 3 (three) times a day (Patient not taking: Reported on 2/6/2024) 3 mL 0   • PARoxetine (PAXIL) 20 mg tablet Take 1 tablet (20 mg total) by mouth daily (Patient not taking: Reported on 2/6/2024) 30 tablet 1     No current facility-administered medications for this visit.          Physical Exam  Vitals reviewed.   Constitutional:       Appearance: Normal appearance. She is well-developed.   HENT:      Head: Normocephalic.      Right Ear: Tympanic membrane, ear canal and external ear normal.      Left Ear: Tympanic membrane, ear canal and external ear normal.      Nose: Nose normal.      Right Sinus: No maxillary sinus tenderness or frontal sinus tenderness.      Left Sinus: No maxillary sinus tenderness or frontal sinus tenderness.      Mouth/Throat:      Mouth: No oral lesions.      Tongue: No lesions.      Pharynx: No oropharyngeal exudate or posterior oropharyngeal erythema.   Eyes:     Neck:      Thyroid: No thyromegaly.   Cardiovascular:      Rate and Rhythm: Normal rate and regular rhythm.      Heart sounds: Normal heart sounds.   Pulmonary:      Effort: Pulmonary effort is normal.      Breath sounds: Normal breath sounds.   Chest:   Breasts:     Right: Swelling and tenderness present. No bleeding or nipple discharge.      Left: No swelling, bleeding, nipple discharge or tenderness.       Musculoskeletal:         General: Normal range of motion.      Cervical back: Neck supple.   Lymphadenopathy:      Cervical:      Right cervical: No superficial or posterior cervical adenopathy.     Left cervical: No superficial or posterior cervical adenopathy.      Upper Body:      Right upper body: No supraclavicular adenopathy.      Left upper body: No supraclavicular adenopathy.   Skin:     General: Skin is warm and dry.   Neurological:      General: No focal deficit  present.      Mental Status: She is alert and oriented to person, place, and time.      GCS: GCS eye subscore is 4. GCS verbal subscore is 5. GCS motor subscore is 6.      Cranial Nerves: No facial asymmetry.      Motor: No weakness.      Coordination: Coordination normal.   Psychiatric:         Behavior: Behavior normal.         Thought Content: Thought content normal.         Judgment: Judgment normal.                     AURY Jang

## 2024-05-31 ENCOUNTER — TELEPHONE (OUTPATIENT)
Age: 44
End: 2024-05-31

## 2024-05-31 DIAGNOSIS — Q67.6 PECTUS EXCAVATUM: Primary | ICD-10-CM

## 2024-05-31 DIAGNOSIS — R53.83 OTHER FATIGUE: ICD-10-CM

## 2024-05-31 DIAGNOSIS — L65.9 HAIR LOSS: ICD-10-CM

## 2024-05-31 DIAGNOSIS — M25.50 ARTHRALGIA, UNSPECIFIED JOINT: ICD-10-CM

## 2024-05-31 DIAGNOSIS — R63.4 WEIGHT LOSS, UNINTENTIONAL: ICD-10-CM

## 2024-05-31 NOTE — TELEPHONE ENCOUNTER
Patient called and results discussed. Xray showed pectus excavatam which was not seen in 2018 xray report. Will do autoimmune blood work and will follow with pulmonologist.  Will do CT chest abdomen pelvis for unintentional weight loss. AURY Jang  h

## 2024-05-31 NOTE — TELEPHONE ENCOUNTER
Pt returning AURY Jang phone call. Attempted to warm transfer to the office unsuccessful. Please call pt back.

## 2024-06-05 DIAGNOSIS — E55.9 VITAMIN D DEFICIENCY: Primary | ICD-10-CM

## 2024-06-05 LAB
25(OH)D3+25(OH)D2 SERPL-MCNC: 26 NG/ML (ref 30–100)
ALBUMIN SERPL-MCNC: 4.5 G/DL (ref 3.9–4.9)
ALBUMIN/GLOB SERPL: 1.8 {RATIO} (ref 1.2–2.2)
ALP SERPL-CCNC: 45 IU/L (ref 44–121)
ALT SERPL-CCNC: 12 IU/L (ref 0–32)
AST SERPL-CCNC: 17 IU/L (ref 0–40)
B BURGDOR IGG+IGM SER QL IA: NEGATIVE
B MICROTI IGG TITR SER: NORMAL {TITER}
B MICROTI IGM TITR SER: NORMAL {TITER}
BILIRUB SERPL-MCNC: 0.4 MG/DL (ref 0–1.2)
BUN SERPL-MCNC: 15 MG/DL (ref 6–24)
BUN/CREAT SERPL: 16 (ref 9–23)
CALCIUM SERPL-MCNC: 9.6 MG/DL (ref 8.7–10.2)
CENTROMERE B AB SER-ACNC: <0.2 AI (ref 0–0.9)
CHLORIDE SERPL-SCNC: 106 MMOL/L (ref 96–106)
CHROMATIN AB SERPL-ACNC: <0.2 AI (ref 0–0.9)
CO2 SERPL-SCNC: 24 MMOL/L (ref 20–29)
CREAT SERPL-MCNC: 0.93 MG/DL (ref 0.57–1)
CRP SERPL-MCNC: <1 MG/L (ref 0–10)
DSDNA AB SER-ACNC: <1 IU/ML (ref 0–9)
EGFR: 78 ML/MIN/1.73
ENA JO1 AB SER-ACNC: <0.2 AI (ref 0–0.9)
ENA RNP AB SER-ACNC: <0.2 AI (ref 0–0.9)
ENA SCL70 AB SER-ACNC: <0.2 AI (ref 0–0.9)
ENA SM AB SER-ACNC: <0.2 AI (ref 0–0.9)
ENA SS-A AB SER-ACNC: <0.2 AI (ref 0–0.9)
ENA SS-B AB SER-ACNC: <0.2 AI (ref 0–0.9)
ERYTHROCYTE [DISTWIDTH] IN BLOOD BY AUTOMATED COUNT: 12.4 % (ref 11.7–15.4)
ERYTHROCYTE [SEDIMENTATION RATE] IN BLOOD BY WESTERGREN METHOD: 2 MM/HR (ref 0–32)
FERRITIN SERPL-MCNC: 24 NG/ML (ref 15–150)
GLOBULIN SER-MCNC: 2.5 G/DL (ref 1.5–4.5)
GLUCOSE SERPL-MCNC: 89 MG/DL (ref 70–99)
HCT VFR BLD AUTO: 40.7 % (ref 34–46.6)
HGB BLD-MCNC: 13.2 G/DL (ref 11.1–15.9)
IRON SATN MFR SERPL: 22 % (ref 15–55)
IRON SERPL-MCNC: 71 UG/DL (ref 27–159)
MCH RBC QN AUTO: 31.1 PG (ref 26.6–33)
MCHC RBC AUTO-ENTMCNC: 32.4 G/DL (ref 31.5–35.7)
MCV RBC AUTO: 96 FL (ref 79–97)
PLATELET # BLD AUTO: 297 X10E3/UL (ref 150–450)
POTASSIUM SERPL-SCNC: 4.2 MMOL/L (ref 3.5–5.2)
PROT SERPL-MCNC: 7 G/DL (ref 6–8.5)
RBC # BLD AUTO: 4.24 X10E6/UL (ref 3.77–5.28)
RESULT/COMMENT: NORMAL
RHEUMATOID FACT SERPL-ACNC: <10 IU/ML
SL AMB SEE BELOW:: NORMAL
SODIUM SERPL-SCNC: 143 MMOL/L (ref 134–144)
TIBC SERPL-MCNC: 318 UG/DL (ref 250–450)
TSH SERPL DL<=0.005 MIU/L-ACNC: 1.24 UIU/ML (ref 0.45–4.5)
UIBC SERPL-MCNC: 247 UG/DL (ref 131–425)
VIT B12 SERPL-MCNC: 542 PG/ML (ref 232–1245)
WBC # BLD AUTO: 6.5 X10E3/UL (ref 3.4–10.8)

## 2024-06-05 RX ORDER — ERGOCALCIFEROL 1.25 MG/1
50000 CAPSULE ORAL WEEKLY
Qty: 4 CAPSULE | Refills: 1 | Status: SHIPPED | OUTPATIENT
Start: 2024-06-05 | End: 2024-07-25

## 2024-06-13 ENCOUNTER — OFFICE VISIT (OUTPATIENT)
Dept: PULMONOLOGY | Facility: MEDICAL CENTER | Age: 44
End: 2024-06-13
Payer: COMMERCIAL

## 2024-06-13 VITALS
TEMPERATURE: 98.1 F | WEIGHT: 126 LBS | OXYGEN SATURATION: 99 % | HEART RATE: 66 BPM | BODY MASS INDEX: 19.78 KG/M2 | DIASTOLIC BLOOD PRESSURE: 78 MMHG | HEIGHT: 67 IN | SYSTOLIC BLOOD PRESSURE: 116 MMHG

## 2024-06-13 DIAGNOSIS — Q67.6 PECTUS EXCAVATUM: ICD-10-CM

## 2024-06-13 DIAGNOSIS — R93.89 ABNORMAL CHEST X-RAY: Primary | ICD-10-CM

## 2024-06-13 PROCEDURE — 99204 OFFICE O/P NEW MOD 45 MIN: CPT | Performed by: INTERNAL MEDICINE

## 2024-06-13 NOTE — ASSESSMENT & PLAN NOTE
In general clear chest x-ray with possible minimal atelectatic changes at the bases, these are of no significance and patient is asymptomatic.  She has been sedentary recently and I encouraged her to start to exercise and also I provided her with incentive spirometer and explained to her how to use.  Otherwise I reassured her that these are minimal atelectatic changes and will not cause her any issues.

## 2024-06-13 NOTE — ASSESSMENT & PLAN NOTE
I explained to the patient that this is a congenital deformity of the chest cavity/sternum and did not most cases does not have any implications on her lungs or heart unless it is severe and in her case it is not.  She is in general asymptomatic from respiratory standpoint.  I calculated her PSI and PCI as following:  Pectus severity index PSI was 2.84 (less than the cutoff of 3.25 )  Pectus correction index PCI was 15% (less than the cutoff of 28%)  Based on the above and specially that the patient is asymptomatic there is no indication for further intervention or surgical evaluation.  I encourage patient to exercise to gain some weight and build more muscles, otherwise no further management or intervention.  She verbalized understanding.

## 2024-06-13 NOTE — PROGRESS NOTES
Consultation - Pulmonary Medicine   Radha Humphreys 43 y.o. female MRN: [id number]    Physician Requesting Consult:      AURY Jang     Reason for Consult: Abnormal chest x-ray    Abnormal chest x-ray  In general clear chest x-ray with possible minimal atelectatic changes at the bases, these are of no significance and patient is asymptomatic.  She has been sedentary recently and I encouraged her to start to exercise and also I provided her with incentive spirometer and explained to her how to use.  Otherwise I reassured her that these are minimal atelectatic changes and will not cause her any issues.    Pectus excavatum  I explained to the patient that this is a congenital deformity of the chest cavity/sternum and did not most cases does not have any implications on her lungs or heart unless it is severe and in her case it is not.  She is in general asymptomatic from respiratory standpoint.  I calculated her PSI and PCI as following:  Pectus severity index PSI was 2.84 (less than the cutoff of 3.25 )  Pectus correction index PCI was 15% (less than the cutoff of 28%)  Based on the above and specially that the patient is asymptomatic there is no indication for further intervention or surgical evaluation.  I encourage patient to exercise to gain some weight and build more muscles, otherwise no further management or intervention.  She verbalized understanding.      Return if symptoms worsen or fail to improve.    Diagnoses and all orders for this visit:    Abnormal chest x-ray    Pectus excavatum  -     Ambulatory Referral to Pulmonology      ______________________________________________________________________    HPI:    Radha Humphreys is a 43 y.o. female who presents for evaluation of abnormal chest x-ray.    Patient has no history of pulmonary disease, recently had some swelling over the anterior/upper right chest, was not much visible as per patient but palpable without erythema or redness or fever or  chills, she followed with her PCP who started some autoimmune workup due to other complaints of swelling in her hands in the past and also did a chest x-ray that showed some abnormalities.    Patient denies dyspnea on exertion or chest tightness or wheezing or any cough or sputum production.  Denies history of asthma.  She sometimes gets central chest pain from time to time but very brief.  She denies nausea or vomiting or abdominal pain or diarrhea.  She denies significant weight loss.  Denies GERD.  Denies skin rashes.  She sometimes has pruritus in her legs when touching certain fluids or water but without rash.  She denies significant arthralgias but she long time she has pain in her thenar/ purlicue space bilaterally but not the small joints per se.  There is a possible rheumatoid arthritis family history.  She reports sporadic low-grade fever  sometimes but currently denies fever.  Also patient has chronic intermittent complaint of orthostatic hypotension, very mild, had cardiac workup in the past and she was told she has autonomic neuropathy.  In general she is doing fine without medications.    Patient has major depression but currently feels fine and denies suicidal thoughts.    She lives at home with her partner, they have 2 dogs, no birds exposure, she is an  with no occupational exposure.  She never smoked cigarettes and denies vaping.        Review of Systems:  Review of Systems   Constitutional:  Positive for fever. Negative for appetite change.   HENT: Negative.  Negative for ear pain, rhinorrhea, sneezing, sore throat and trouble swallowing.    Eyes: Negative.    Respiratory: Negative.     Cardiovascular:  Positive for chest pain.   Gastrointestinal: Negative.    Endocrine: Negative.    Genitourinary: Negative.    Musculoskeletal:  Positive for myalgias.   Skin: Negative.    Allergic/Immunologic: Negative.    Neurological:  Positive for headaches.   Hematological: Negative.   "  Psychiatric/Behavioral: Negative.       Aside from what is mentioned in the HPI, the review of systems otherwise negative.    Current Medications:    Current Outpatient Medications:     busPIRone (BUSPAR) 10 mg tablet, Take 1 tablet (10 mg total) by mouth 3 (three) times a day, Disp: 90 tablet, Rfl: 1    ergocalciferol (VITAMIN D2) 50,000 units, Take 1 capsule (50,000 Units total) by mouth once a week for 8 doses, Disp: 4 capsule, Rfl: 1    hydrOXYzine HCL (ATARAX) 25 mg tablet, Take 1 tablet (25 mg total) by mouth every 6 (six) hours as needed for anxiety (Patient not taking: Reported on 6/13/2024), Disp: 30 tablet, Rfl: 5    meloxicam (Mobic) 15 mg tablet, Take 1 tablet (15 mg total) by mouth daily (Patient not taking: Reported on 5/29/2024), Disp: 30 tablet, Rfl: 0    moxifloxacin (VIGAMOX) 0.5 % ophthalmic solution, Administer 1 drop to the right eye 3 (three) times a day (Patient not taking: Reported on 2/6/2024), Disp: 3 mL, Rfl: 0    PARoxetine (PAXIL) 20 mg tablet, Take 1 tablet (20 mg total) by mouth daily (Patient not taking: Reported on 2/6/2024), Disp: 30 tablet, Rfl: 1    Historical Information   Past Medical History:   Diagnosis Date    Autonomic neuropathy      Past Surgical History:   Procedure Laterality Date    CHOLECYSTECTOMY      OVARIAN CYST REMOVAL       Social History   Social History     Tobacco Use   Smoking Status Never    Passive exposure: Past   Smokeless Tobacco Never       Occupational history:  No occupational exposure    Family History:   History reviewed. No pertinent family history.      PhysicalExamination:  Vitals:   /78 (BP Location: Left arm, Patient Position: Sitting, Cuff Size: Adult)   Pulse 66   Temp 98.1 °F (36.7 °C) (Temporal)   Ht 5' 7\" (1.702 m)   Wt 57.2 kg (126 lb)   LMP 04/25/2024 (Approximate)   SpO2 99%   BMI 19.73 kg/m²     Gen:  Comfortable on room air.  No conversational dyspnea  HEENT:  Conjugate gaze.  sclerae anicteric.  Oropharynx moist  Neck: " "Trachea is midline. No JVD. No adenopathy  Chest: Equal breath sounds and clear to auscultation bilaterally, no wheezing or crackles.  Mild pectus excavatum  Cardiac: S1-S2 regular. no murmur  Abdomen:  benign  Extremities: No edema  Neuro:  Normal speech and mentation      Diagnostic Data:  Labs:  I personally reviewed the most recent laboratory data pertinent to today's visit    Lab Results   Component Value Date    WBC 6.5 06/01/2024    HGB 13.2 06/01/2024    HCT 40.7 06/01/2024    MCV 96 06/01/2024     06/01/2024     Lab Results   Component Value Date    CALCIUM 8.8 03/31/2023    K 4.2 06/01/2024    CO2 24 06/01/2024     06/01/2024    BUN 15 06/01/2024    CREATININE 0.93 06/01/2024     No results found for: \"IGE\"  Lab Results   Component Value Date    ALT 12 06/01/2024    AST 17 06/01/2024    ALKPHOS 41 03/31/2023         Imaging:  I personally reviewed the images on the PAC system pertinent to today's visit  Chest x-ray reviewed on PACS: Clear lungs parenchyma, possible mild subsegmental atelectatic changes at the bases but very minimal.  Pectus excavatum on lateral x-ray.    Chest CT scan from 2013 reviewed on PACS with the patient in the room: Normal and clear lung parenchyma with no abnormalities, pectus excavatum         Kirank Donny Harry MD  Answers submitted by the patient for this visit:  Pulmonology Questionnaire (Submitted on 6/12/2024)  Chief Complaint: Primary symptoms  Chronicity: new  When did you first notice your symptoms?: in the past 7 days  How often do your symptoms occur?: rarely  Since you first noticed this problem, how has it changed?: unchanged  Do you have shortness of breath that occurs with effort or exertion?: No  Do you have ear congestion?: No  Do you have heartburn?: No  Do you have fatigue?: Yes  Do you have nasal congestion?: No  Do you have shortness of breath when lying flat?: No  Do you have shortness of breath when you wake up?: No  Do you have sweats?: " No  Have you experienced weight loss?: Yes  Which of the following makes your symptoms worse?: nothing  Which of the following makes your symptoms better?: nothing

## 2024-07-25 ENCOUNTER — HOSPITAL ENCOUNTER (OUTPATIENT)
Dept: RADIOLOGY | Facility: HOSPITAL | Age: 44
Discharge: HOME | End: 2024-07-25
Payer: COMMERCIAL

## 2024-07-25 ENCOUNTER — TELEPHONE (OUTPATIENT)
Dept: FAMILY MEDICINE CLINIC | Facility: CLINIC | Age: 44
End: 2024-07-25

## 2024-07-25 DIAGNOSIS — Z12.31 SCREENING MAMMOGRAM, ENCOUNTER FOR: Primary | ICD-10-CM

## 2024-07-25 DIAGNOSIS — R92.30 DENSE BREAST: ICD-10-CM

## 2024-07-25 DIAGNOSIS — N64.4 BREAST TENDERNESS IN FEMALE: ICD-10-CM

## 2024-07-25 PROCEDURE — G0279 TOMOSYNTHESIS, MAMMO: HCPCS

## 2024-07-25 PROCEDURE — 77066 DX MAMMO INCL CAD BI: CPT

## 2024-07-25 PROCEDURE — 76642 ULTRASOUND BREAST LIMITED: CPT

## 2024-07-25 NOTE — TELEPHONE ENCOUNTER
----- Message from AURY Jang sent at 7/25/2024  3:54 PM EDT -----  Tried to call patient but no answer. Please let her know that her mammogram did not show any concerns and evidence of malignancy. Please inform her that her breast is very dense and mammograms can miss small masses with dense breast and I recommend ABUS and she can schedule that. Also schedule her follow up with her PCP dr. Galvan as she was having weight loss last time and needs to follow on that. AURY Jang

## 2024-10-02 DIAGNOSIS — F41.1 GAD (GENERALIZED ANXIETY DISORDER): ICD-10-CM

## 2024-10-02 RX ORDER — HYDROXYZINE HYDROCHLORIDE 25 MG/1
25 TABLET, FILM COATED ORAL EVERY 6 HOURS PRN
Qty: 30 TABLET | Refills: 5 | Status: SHIPPED | OUTPATIENT
Start: 2024-10-02

## 2024-12-26 DIAGNOSIS — F41.1 GAD (GENERALIZED ANXIETY DISORDER): ICD-10-CM

## 2025-01-05 DIAGNOSIS — F41.1 GAD (GENERALIZED ANXIETY DISORDER): ICD-10-CM

## 2025-01-07 RX ORDER — HYDROXYZINE HYDROCHLORIDE 25 MG/1
25 TABLET, FILM COATED ORAL EVERY 6 HOURS PRN
Qty: 30 TABLET | Refills: 0 | Status: SHIPPED | OUTPATIENT
Start: 2025-01-07

## 2025-01-07 RX ORDER — HYDROXYZINE HYDROCHLORIDE 25 MG/1
25 TABLET, FILM COATED ORAL EVERY 6 HOURS PRN
Qty: 30 TABLET | Refills: 5 | Status: SHIPPED | OUTPATIENT
Start: 2025-01-07

## 2025-01-07 NOTE — TELEPHONE ENCOUNTER
Patient needs an appointment. Please contact the patient to schedule an appointment. Last office visit: 5/29/2024

## 2025-01-20 ENCOUNTER — OFFICE VISIT (OUTPATIENT)
Dept: FAMILY MEDICINE CLINIC | Facility: CLINIC | Age: 45
End: 2025-01-20
Payer: COMMERCIAL

## 2025-01-20 VITALS
WEIGHT: 131.8 LBS | BODY MASS INDEX: 20.69 KG/M2 | HEIGHT: 67 IN | HEART RATE: 56 BPM | SYSTOLIC BLOOD PRESSURE: 100 MMHG | DIASTOLIC BLOOD PRESSURE: 72 MMHG | TEMPERATURE: 98 F

## 2025-01-20 DIAGNOSIS — F41.1 GAD (GENERALIZED ANXIETY DISORDER): ICD-10-CM

## 2025-01-20 DIAGNOSIS — F32.2 SEVERE MAJOR DEPRESSIVE DISORDER (HCC): Primary | ICD-10-CM

## 2025-01-20 PROCEDURE — 99213 OFFICE O/P EST LOW 20 MIN: CPT | Performed by: NURSE PRACTITIONER

## 2025-01-20 RX ORDER — HYDROXYZINE HYDROCHLORIDE 25 MG/1
25 TABLET, FILM COATED ORAL 2 TIMES DAILY PRN
Qty: 60 TABLET | Refills: 5 | Status: SHIPPED | OUTPATIENT
Start: 2025-01-20 | End: 2025-07-19

## 2025-01-20 NOTE — PATIENT INSTRUCTIONS
Patient Education     Hydroxyzine (emerson denis)   Brand Names: US Vistaril   Brand Names: Raymond Atarax; CARLOS-Hydroxyzin; PMS-HydrOXYzine HCl [DSC]   What is this drug used for?   It is used to treat itching.  It is used to treat anxiety.  It is used to put you to sleep for surgery.  It is used to treat mood problems.  It is used to treat upset stomach and throwing up.  It may be given to you for other reasons. Talk with the doctor.  What do I need to tell my doctor BEFORE I take this drug?   If you are allergic to this drug; any part of this drug; or any other drugs, foods, or substances. Tell your doctor about the allergy and what signs you had.  If you have ever had a long QT on ECG.  If you are in early pregnancy. Do not take this drug during early pregnancy.  If you are breast-feeding. Do not breast-feed while you take this drug.  This is not a list of all drugs or health problems that interact with this drug.  Tell your doctor and pharmacist about all of your drugs (prescription or OTC, natural products, vitamins) and health problems. You must check to make sure that it is safe for you to take this drug with all of your drugs and health problems. Do not start, stop, or change the dose of any drug without checking with your doctor.  What are some things I need to know or do while I take this drug?   All products:   Tell all of your health care providers that you take this drug. This includes your doctors, nurses, pharmacists, and dentists.  Avoid driving and doing other tasks or actions that call for you to be alert until you see how this drug affects you.  Talk with your doctor before you use alcohol, marijuana or other forms of cannabis, or prescription or OTC drugs that may slow your actions.  An unsafe heartbeat that is not normal (long QT on ECG) has happened with this drug. This may raise the chance of sudden death. Talk with the doctor.  If you are 65 or older, use this drug with care. You could  have more side effects.  Tell your doctor if you are pregnant or plan on getting pregnant. You will need to talk about the benefits and risks of using this drug while you are pregnant.  Injection:   Unsafe heart problems and sometimes death have rarely happened when this drug was given with alcohol or certain other drugs that may slow your actions. Talk with your doctor.  What are some side effects that I need to call my doctor about right away?   WARNING/CAUTION: Even though it may be rare, some people may have very bad and sometimes deadly side effects when taking a drug. Tell your doctor or get medical help right away if you have any of the following signs or symptoms that may be related to a very bad side effect:  All products:   Signs of an allergic reaction, like rash; hives; itching; red, swollen, blistered, or peeling skin with or without fever; wheezing; tightness in the chest or throat; trouble breathing, swallowing, or talking; unusual hoarseness; or swelling of the mouth, face, lips, tongue, or throat.  Fast or abnormal heartbeat.  Very bad dizziness or passing out.  Trouble controlling body movements.  Feeling confused.  Rarely, a very bad skin reaction has happened with this drug. Signs include fever and many small skin spots within large areas of redness and swelling. Call your doctor right away if you have a rash or any of these signs.  Injection:   Tissue damage has happened with this drug. Sometimes, this has led to surgery. Tell your nurse if you have any burning, color changes, pain, skin breakdown, or swelling where the shot was given.  What are some other side effects of this drug?   All drugs may cause side effects. However, many people have no side effects or only have minor side effects. Call your doctor or get medical help if any of these side effects or any other side effects bother you or do not go away:  Dry mouth.  Feeling sleepy.  These are not all of the side effects that may occur.  If you have questions about side effects, call your doctor. Call your doctor for medical advice about side effects.  You may report side effects to your national health agency.  You may report side effects to the FDA at 1-115.593.1618. You may also report side effects at https://www.fda.gov/medwatch.  How is this drug best taken?   Use this drug as ordered by your doctor. Read all information given to you. Follow all instructions closely.  All oral products:   Take with or without food. Take with food if it causes an upset stomach.  Liquid:   Measure liquid doses carefully. Use the measuring device that comes with this drug. If there is none, ask the pharmacist for a device to measure this drug.  Injection:   It is given as a shot into a muscle.  What do I do if I miss a dose?   All oral products:   If you take this drug on a regular basis, take a missed dose as soon as you think about it.  If it is close to the time for your next dose, skip the missed dose and go back to your normal time.  Do not take 2 doses at the same time or extra doses.  Many times this drug is taken on an as needed basis. Do not take more often than told by the doctor.  Injection:   Call your doctor to find out what to do.  How do I store and/or throw out this drug?   All oral products:   Store at room temperature protected from light. Store in a dry place. Do not store in a bathroom.  Injection:   If you need to store this drug at home, talk with your doctor, nurse, or pharmacist about how to store it.  All products:   Keep all drugs in a safe place. Keep all drugs out of the reach of children and pets.  Throw away unused or  drugs. Do not flush down a toilet or pour down a drain unless you are told to do so. Check with your pharmacist if you have questions about the best way to throw out drugs. There may be drug take-back programs in your area.  General drug facts   If your symptoms or health problems do not get better or if they  become worse, call your doctor.  Do not share your drugs with others and do not take anyone else's drugs.  Some drugs may have another patient information leaflet. If you have any questions about this drug, please talk with your doctor, nurse, pharmacist, or other health care provider.  Some drugs may have another patient information leaflet. Check with your pharmacist. If you have any questions about this drug, please talk with your doctor, nurse, pharmacist, or other health care provider.  If you think there has been an overdose, call your poison control center or get medical care right away. Be ready to tell or show what was taken, how much, and when it happened.  Consumer Information Use and Disclaimer   This generalized information is a limited summary of diagnosis, treatment, and/or medication information. It is not meant to be comprehensive and should be used as a tool to help the user understand and/or assess potential diagnostic and treatment options. It does NOT include all information about conditions, treatments, medications, side effects, or risks that may apply to a specific patient. It is not intended to be medical advice or a substitute for the medical advice, diagnosis, or treatment of a health care provider based on the health care provider's examination and assessment of a patient's specific and unique circumstances. Patients must speak with a health care provider for complete information about their health, medical questions, and treatment options, including any risks or benefits regarding use of medications. This information does not endorse any treatments or medications as safe, effective, or approved for treating a specific patient. [employer] and its affiliates disclaim any warranty or liability relating to this information or the use thereof. The use of this information is governed by the Terms of Use, available at https://www.wolterskluwer.com/en/know/clinical-effectiveness-terms.  Last  Reviewed Date   2023-05-31  Copyright   © 2024 [employer] and its affiliates and/or licensors. All rights reserved.

## 2025-01-20 NOTE — ASSESSMENT & PLAN NOTE
Orders:  •  hydrOXYzine HCL (ATARAX) 25 mg tablet; Take 1 tablet (25 mg total) by mouth 2 (two) times a day as needed for anxiety

## 2025-01-20 NOTE — ASSESSMENT & PLAN NOTE
Depression Screening Follow-up Plan: Patient's depression screening was positive with a PHQ-9 score of 11.   Depression Screening Follow-up Plan: Patient's depression screening was positive with a PHQ-9 score of 11. Continue regular follow-up with their psychologist/therapist/psychiatrist who is managing their mental health condition(s).

## 2025-01-20 NOTE — PROGRESS NOTES
Name: Radha Humphreys      : 1980      MRN: [id number]  Encounter Provider: AURY Jang  Encounter Date: 2025   Encounter department: [facility]  :  Chief Complaint   Patient presents with   • Medication Management     [id number]        No future appointments.    Assessment & Plan  ERI (generalized anxiety disorder)    Orders:  •  hydrOXYzine HCL (ATARAX) 25 mg tablet; Take 1 tablet (25 mg total) by mouth 2 (two) times a day as needed for anxiety    Severe major depressive disorder (HCC)  Depression Screening Follow-up Plan: Patient's depression screening was positive with a PHQ-9 score of 11.   Depression Screening Follow-up Plan: Patient's depression screening was positive with a PHQ-9 score of 11. Continue regular follow-up with their psychologist/therapist/psychiatrist who is managing their mental health condition(s).          PHQ-2/9 Depression Screening    Little interest or pleasure in doing things: 1 - several days  Feeling down, depressed, or hopeless: 1 - several days  Trouble falling or staying asleep, or sleeping too much: 3 - nearly every day  Feeling tired or having little energy: 1 - several days  Poor appetite or overeatin - several days  Feeling bad about yourself - or that you are a failure or have let yourself or your family down: 1 - several days  Trouble concentrating on things, such as reading the newspaper or watching television: 1 - several days  Moving or speaking so slowly that other people could have noticed. Or the opposite - being so fidgety or restless that you have been moving around a lot more than usual: 1 - several days  Thoughts that you would be better off dead, or of hurting yourself in some way: 0 - not at all  PHQ-9 Score: 10  PHQ-9 Interpretation: Moderate depression             History of Present Illness   Patient is here to follow up on anxiety  Stated that taking hydroxyzine twice a day and her anxiety is well controlled. Denies any other  "issues  See therapist for depression      Review of Systems   HENT: Negative.     Respiratory: Negative.     Cardiovascular: Negative.    Gastrointestinal: Negative.    Genitourinary: Negative.        Objective   /72   Pulse 56   Temp 98 °F (36.7 °C)   Ht 5' 7\" (1.702 m)   Wt 59.8 kg (131 lb 12.8 oz)   LMP 11/27/2024 (Exact Date)   BMI 20.64 kg/m²      Physical Exam  HENT:      Head: Normocephalic.   Eyes:      Conjunctiva/sclera: Conjunctivae normal.   Cardiovascular:      Rate and Rhythm: Normal rate and regular rhythm.      Pulses: Normal pulses.      Heart sounds: Normal heart sounds.   Pulmonary:      Effort: Pulmonary effort is normal.      Breath sounds: Normal breath sounds.   Skin:     General: Skin is warm and dry.   Neurological:      Mental Status: She is alert and oriented to person, place, and time.   Psychiatric:         Mood and Affect: Mood normal.         Behavior: Behavior normal.         Thought Content: Thought content normal.         Judgment: Judgment normal.         "

## 2025-04-23 ENCOUNTER — APPOINTMENT (OUTPATIENT)
Dept: RADIOLOGY | Facility: CLINIC | Age: 45
End: 2025-04-23
Payer: COMMERCIAL

## 2025-04-23 ENCOUNTER — OFFICE VISIT (OUTPATIENT)
Dept: URGENT CARE | Facility: CLINIC | Age: 45
End: 2025-04-23
Payer: COMMERCIAL

## 2025-04-23 VITALS
RESPIRATION RATE: 14 BRPM | SYSTOLIC BLOOD PRESSURE: 96 MMHG | HEART RATE: 64 BPM | DIASTOLIC BLOOD PRESSURE: 80 MMHG | TEMPERATURE: 98.2 F | OXYGEN SATURATION: 97 %

## 2025-04-23 DIAGNOSIS — S99.912A ANKLE INJURY, LEFT, INITIAL ENCOUNTER: ICD-10-CM

## 2025-04-23 DIAGNOSIS — S99.922A FOOT INJURY, LEFT, INITIAL ENCOUNTER: Primary | ICD-10-CM

## 2025-04-23 DIAGNOSIS — S99.922A FOOT INJURY, LEFT, INITIAL ENCOUNTER: ICD-10-CM

## 2025-04-23 PROCEDURE — 99214 OFFICE O/P EST MOD 30 MIN: CPT

## 2025-04-23 PROCEDURE — 73610 X-RAY EXAM OF ANKLE: CPT

## 2025-04-23 PROCEDURE — 73630 X-RAY EXAM OF FOOT: CPT

## 2025-04-23 NOTE — PROGRESS NOTES
[facility]        NAME: Radha Humphreys is a 44 y.o. female  : 1980    MRN: [id number]  DATE: 2025  TIME: 6:57 PM    Assessment and Plan   Foot injury, left, initial encounter [S99.922A]  1. Foot injury, left, initial encounter  XR foot 3+ vw left      2. Ankle injury, left, initial encounter  XR ankle 3+ vw left        XR L foot and ankle- No acute osseous abnormality noted. Pending final radiology read.     Pain likely due to sprain. Supportive management.     Patient Instructions     Rest injured extremity  Gentle stretching as tolerated  Heat or ice to site of injury as directed  20 minutes at a time with breaks in between   Ice for the first 2-3 days, heat after unless continued swelling noted  Ibuprofen and Tylenol for pain as needed     Follow up with PCP in 3-5 days   Proceed to ER if worsening symptoms       If tests are performed, our office will contact you with results only if changes need to made to the care plan discussed with you at the visit. [facility]    Chief Complaint     Chief Complaint   Patient presents with    Ankle Injury     Pt presents with left ankle/foot injury r/t tripping /falling on  evening; states tripped causing left ankle to roll under her          History of Present Illness       Ankle Pain   The incident occurred 3 to 5 days ago. Incident location: at [facility]. The injury mechanism was an inversion injury (down stairs). The pain is present in the left ankle and left foot. The pain is moderate. The pain has been Constant since onset. Pertinent negatives include no inability to bear weight, loss of motion, loss of sensation, muscle weakness, numbness or tingling. The symptoms are aggravated by weight bearing, palpation and movement. She has tried NSAIDs for the symptoms. The treatment provided mild relief.       Review of Systems   Review of Systems   Constitutional:  Negative for chills and fever.    HENT:  Negative for congestion, postnasal drip, rhinorrhea, sinus pressure, sore throat and trouble swallowing.    Respiratory:  Negative for cough, chest tightness and shortness of breath.    Cardiovascular:  Negative for chest pain and palpitations.   Gastrointestinal:  Negative for abdominal pain, nausea and vomiting.   Genitourinary:  Negative for difficulty urinating.   Musculoskeletal:  Positive for gait problem (due to pain). Negative for myalgias.   Neurological:  Negative for dizziness, tingling, numbness and headaches.         Current Medications       Current Outpatient Medications:     ergocalciferol (VITAMIN D2) 50,000 units, Take 1 capsule (50,000 Units total) by mouth once a week for 8 doses, Disp: 4 capsule, Rfl: 1    hydrOXYzine HCL (ATARAX) 25 mg tablet, Take 1 tablet (25 mg total) by mouth 2 (two) times a day as needed for anxiety (Patient not taking: Reported on 4/23/2025), Disp: 60 tablet, Rfl: 5    Current Allergies     Allergies as of 04/23/2025    (No Known Allergies)            The following portions of the patient's history were reviewed and updated as appropriate: allergies, current medications, past family history, past medical history, past social history, past surgical history and problem list.     Past Medical History:   Diagnosis Date    Autonomic neuropathy        Past Surgical History:   Procedure Laterality Date    CHOLECYSTECTOMY      OVARIAN CYST REMOVAL         Family History   Problem Relation Age of Onset    Hypertension Mother     No Known Problems Father     No Known Problems Brother          Medications have been verified.        Objective   BP 96/80   Pulse 64   Temp 98.2 °F (36.8 °C) (Tympanic)   Resp 14   SpO2 97%        Physical Exam     Physical Exam  Constitutional:       General: She is not in acute distress.  HENT:      Head: Normocephalic.      Nose: Nose normal.   Eyes:      Pupils: Pupils are equal, round, and reactive to light.   Cardiovascular:      Rate  and Rhythm: Normal rate and regular rhythm.      Pulses: Normal pulses.      Heart sounds: Normal heart sounds.   Pulmonary:      Effort: Pulmonary effort is normal.      Breath sounds: Normal breath sounds.   Abdominal:      General: Abdomen is flat.   Musculoskeletal:      Right foot: Normal.      Left foot: Decreased range of motion (due to pain). Tenderness (point tenderness to dorsal foot) present. No swelling or laceration. Normal pulse.   Skin:     General: Skin is warm and dry.      Capillary Refill: Capillary refill takes less than 2 seconds.   Neurological:      Mental Status: She is alert and oriented to person, place, and time.

## 2025-05-15 ENCOUNTER — TELEPHONE (OUTPATIENT)
Age: 45
End: 2025-05-15

## 2025-05-15 NOTE — TELEPHONE ENCOUNTER
Pt is on  schedule 5/16 , just want to make sure she is coming in to see the right doctor . There is no description for the reason of her visit. Provided call back number to call back to let us know.

## 2025-06-14 ENCOUNTER — HOSPITAL ENCOUNTER (OUTPATIENT)
Dept: RADIOLOGY | Facility: HOSPITAL | Age: 45
Discharge: HOME | End: 2025-06-14
Payer: COMMERCIAL

## 2025-06-14 DIAGNOSIS — Z12.31 ENCOUNTER FOR SCREENING MAMMOGRAM FOR MALIGNANT NEOPLASM OF BREAST: ICD-10-CM

## 2025-06-14 PROCEDURE — 77063 BREAST TOMOSYNTHESIS BI: CPT

## 2025-06-14 PROCEDURE — 77067 SCR MAMMO BI INCL CAD: CPT

## 2025-06-16 ENCOUNTER — RESULTS FOLLOW-UP (OUTPATIENT)
Dept: FAMILY MEDICINE CLINIC | Facility: CLINIC | Age: 45
End: 2025-06-16

## 2025-06-19 ENCOUNTER — OFFICE VISIT (OUTPATIENT)
Dept: OBGYN CLINIC | Facility: CLINIC | Age: 45
End: 2025-06-19
Payer: COMMERCIAL

## 2025-06-19 VITALS — WEIGHT: 129 LBS | DIASTOLIC BLOOD PRESSURE: 70 MMHG | SYSTOLIC BLOOD PRESSURE: 100 MMHG | BODY MASS INDEX: 20.2 KG/M2

## 2025-06-19 DIAGNOSIS — Z12.11 SCREENING FOR COLORECTAL CANCER: ICD-10-CM

## 2025-06-19 DIAGNOSIS — Z01.419 WOMEN'S ANNUAL ROUTINE GYNECOLOGICAL EXAMINATION: Primary | ICD-10-CM

## 2025-06-19 DIAGNOSIS — Z12.12 SCREENING FOR COLORECTAL CANCER: ICD-10-CM

## 2025-06-19 PROCEDURE — S0610 ANNUAL GYNECOLOGICAL EXAMINA: HCPCS | Performed by: NURSE PRACTITIONER

## 2025-06-19 PROCEDURE — G0145 SCR C/V CYTO,THINLAYER,RESCR: HCPCS | Performed by: NURSE PRACTITIONER

## 2025-06-19 PROCEDURE — G0476 HPV COMBO ASSAY CA SCREEN: HCPCS | Performed by: NURSE PRACTITIONER

## 2025-06-19 NOTE — PROGRESS NOTES
Subjective    HPI:     Radha Humphreys is a 44 y.o. female. She is a  0 Para 0. Her menstrual cycles are regular and predictable. Her current method of contraception includes abstinence. She denies /GI and Gyn complaints. She feels safe at home. She complains of some anxiety- therapy weekly.  Medical, surgical and family history reviewed. Her dental care is up-to-date. She eats a healthy diet and exercises regularly. She is happy with her weight.     Visit Vitals  /70   Wt 58.5 kg (129 lb)   LMP 06/10/2025 (Approximate)   BMI 20.20 kg/m²   OB Status Having periods   Smoking Status Never   BSA 1.68 m²       Gynecologic History    Patient's last menstrual period was 06/10/2025 (approximate).  Gardasil Vaccine Series:   Last Pap: before  - normal per patient. .has never had an abnormal  Last mammogram: 2025. Results were: normal  Colonoscopy: referral provided    Obstetric and Medical History    OB History    Para Term  AB Living           SAB IAB Ectopic Multiple Live Births       0       Past Medical History[1]    Past Surgical History[2]    The following portions of the patient's history were reviewed and updated as appropriate: allergies, current medications, past family history, past medical history, past social history, past surgical history, and problem list.    Review of Systems    Pertinent items are noted in HPI.      Objective    Physical Exam  Constitutional:       Appearance: Normal appearance. She is well-developed.   Genitourinary:      Vulva, bladder and urethral meatus normal.      No lesions in the vagina.      Right Labia: No rash, tenderness, lesions, skin changes or Bartholin's cyst.     Left Labia: No tenderness, lesions, skin changes, Bartholin's cyst or rash.     No labial fusion noted.      No inguinal adenopathy present in the right or left side.     No vaginal discharge, erythema, tenderness, bleeding or granulation tissue.      No vaginal prolapse  present.     No vaginal atrophy present.       Right Adnexa: not tender, not full and no mass present.     Left Adnexa: not tender, not full and no mass present.     Cervix is nulliparous.      No cervical motion tenderness, discharge, friability, lesion, polyp or nabothian cyst.      Uterus is not enlarged, tender, irregular or prolapsed.      No uterine mass detected.     Uterus is anteverted.      Pelvic exam was performed with patient in the lithotomy position.   Breasts:     Breasts are symmetrical.      Right: No inverted nipple, mass, nipple discharge, skin change or tenderness.      Left: No inverted nipple, mass, nipple discharge, skin change or tenderness.   HENT:      Head: Normocephalic and atraumatic.   Neck:      Thyroid: No thyromegaly.     Cardiovascular:      Rate and Rhythm: Normal rate and regular rhythm.      Heart sounds: Normal heart sounds, S1 normal and S2 normal.   Pulmonary:      Effort: Pulmonary effort is normal.      Breath sounds: Normal breath sounds.   Abdominal:      General: Bowel sounds are normal. There is no distension.      Palpations: Abdomen is soft. There is no mass.      Tenderness: There is no abdominal tenderness. There is no guarding.      Hernia: There is no hernia in the left inguinal area or right inguinal area.     Musculoskeletal:      Cervical back: Neck supple.   Lymphadenopathy:      Cervical: No cervical adenopathy.      Upper Body:      Right upper body: No supraclavicular or axillary adenopathy.      Left upper body: No supraclavicular or axillary adenopathy.      Lower Body: No right inguinal adenopathy. No left inguinal adenopathy.     Neurological:      Mental Status: She is alert.     Skin:     General: Skin is warm and dry.      Findings: No rash.     Psychiatric:         Attention and Perception: Attention and perception normal.         Mood and Affect: Mood and affect normal.         Speech: Speech normal.         Behavior: Behavior is cooperative.          Thought Content: Thought content normal.         Cognition and Memory: Cognition and memory normal.         Judgment: Judgment normal.   Vitals and nursing note reviewed.          Assessment and Plan    Radha was seen today for gynecologic exam.    Diagnoses and all orders for this visit:    Women's annual routine gynecological examination    Screening for colorectal cancer  -     Ambulatory Referral to Gastroenterology; Future      Patient informed of a Stable GYN exam. A pap smear was performed.     I have discussed the importance of exercise and healthy diet as well as adequate intake of calcium and vitamin D. The current ASCCP guidelines were reviewed. The low risk patient will receive pap smear screening every 3 years until the age of 29 and then every 3 to 5 years with HPV co-testing from the ages of 30-65. I emphasized the importance of an annual pelvic and breast exam. A yearly mammogram is recommended for breast cancer screening starting at age 40.     Results will be released to [facility], if abnormal will call to review and discuss treatment plan.     All questions have been answered to her satisfaction.       Contraception: abstinence.  Follow up in: 1 year or sooner if needed.           [1]   Past Medical History:  Diagnosis Date    Arthritis     Autonomic neuropathy     Depression    [2]   Past Surgical History:  Procedure Laterality Date    CHOLECYSTECTOMY      OVARIAN CYST REMOVAL

## 2025-06-20 LAB
HPV HR 12 DNA CVX QL NAA+PROBE: NEGATIVE
HPV16 DNA CVX QL NAA+PROBE: NEGATIVE
HPV18 DNA CVX QL NAA+PROBE: NEGATIVE

## 2025-06-23 ENCOUNTER — RESULTS FOLLOW-UP (OUTPATIENT)
Dept: OBGYN CLINIC | Facility: CLINIC | Age: 45
End: 2025-06-23

## 2025-06-24 LAB
LAB AP GYN PRIMARY INTERPRETATION: NORMAL
Lab: NORMAL

## 2025-07-10 DIAGNOSIS — F41.1 GAD (GENERALIZED ANXIETY DISORDER): ICD-10-CM

## 2025-07-10 RX ORDER — HYDROXYZINE HYDROCHLORIDE 25 MG/1
25 TABLET, FILM COATED ORAL EVERY 6 HOURS PRN
Qty: 30 TABLET | Refills: 0 | Status: SHIPPED | OUTPATIENT
Start: 2025-07-10

## 2025-07-23 ENCOUNTER — OFFICE VISIT (OUTPATIENT)
Dept: FAMILY MEDICINE CLINIC | Facility: CLINIC | Age: 45
End: 2025-07-23
Payer: COMMERCIAL

## 2025-07-23 VITALS
WEIGHT: 130 LBS | DIASTOLIC BLOOD PRESSURE: 62 MMHG | SYSTOLIC BLOOD PRESSURE: 98 MMHG | HEIGHT: 67 IN | HEART RATE: 62 BPM | TEMPERATURE: 97.5 F | RESPIRATION RATE: 16 BRPM | BODY MASS INDEX: 20.4 KG/M2

## 2025-07-23 DIAGNOSIS — Z13.89 SCREENING FOR CARDIOVASCULAR, RESPIRATORY, AND GENITOURINARY DISEASES: ICD-10-CM

## 2025-07-23 DIAGNOSIS — F41.1 GAD (GENERALIZED ANXIETY DISORDER): ICD-10-CM

## 2025-07-23 DIAGNOSIS — Z11.4 SCREENING FOR HIV (HUMAN IMMUNODEFICIENCY VIRUS): ICD-10-CM

## 2025-07-23 DIAGNOSIS — F33.9 RECURRENT DEPRESSION (HCC): Primary | ICD-10-CM

## 2025-07-23 DIAGNOSIS — Z13.6 SCREENING FOR CARDIOVASCULAR, RESPIRATORY, AND GENITOURINARY DISEASES: ICD-10-CM

## 2025-07-23 DIAGNOSIS — Z13.0 SCREENING FOR DEFICIENCY ANEMIA: ICD-10-CM

## 2025-07-23 DIAGNOSIS — Z13.29 SCREENING FOR THYROID DISORDER: ICD-10-CM

## 2025-07-23 DIAGNOSIS — Z11.59 NEED FOR HEPATITIS C SCREENING TEST: ICD-10-CM

## 2025-07-23 DIAGNOSIS — Z13.83 SCREENING FOR CARDIOVASCULAR, RESPIRATORY, AND GENITOURINARY DISEASES: ICD-10-CM

## 2025-07-23 PROBLEM — F32.2 SEVERE MAJOR DEPRESSIVE DISORDER (HCC): Status: RESOLVED | Noted: 2025-01-20 | Resolved: 2025-07-23

## 2025-07-23 PROCEDURE — 99214 OFFICE O/P EST MOD 30 MIN: CPT | Performed by: FAMILY MEDICINE

## 2025-07-23 RX ORDER — DULOXETIN HYDROCHLORIDE 20 MG/1
20 CAPSULE, DELAYED RELEASE ORAL DAILY
Qty: 30 CAPSULE | Refills: 1 | Status: SHIPPED | OUTPATIENT
Start: 2025-07-23

## 2025-07-23 RX ORDER — SEMAGLUTIDE 2.68 MG/ML
2 INJECTION, SOLUTION SUBCUTANEOUS
COMMUNITY
Start: 2025-06-26 | End: 2025-07-23 | Stop reason: ALTCHOICE

## 2025-07-23 NOTE — PROGRESS NOTES
Name: Radha Humphreys      : 1980      MRN: [id number]  Encounter Provider: Muna Galvan MD  Encounter Date: 2025   Encounter department: [facility]  :  Assessment & Plan  Recurrent depression (HCC)  Depression Screening Follow-up Plan: Patient's depression screening was positive with a PHQ-9 score of 17. Patient with underlying depression and was advised to continue current medications as prescribed.  Will start cymbalta. Follow up in 6 weeks.   Orders:    DULoxetine (CYMBALTA) 20 mg capsule; Take 1 capsule (20 mg total) by mouth daily    ERI (generalized anxiety disorder)    Orders:    DULoxetine (CYMBALTA) 20 mg capsule; Take 1 capsule (20 mg total) by mouth daily    Screening for deficiency anemia    Orders:    CBC and differential; Future    Screening for cardiovascular, respiratory, and genitourinary diseases    Orders:    Comprehensive metabolic panel; Future    Lipid Panel with Direct LDL reflex; Future    Screening for thyroid disorder    Orders:    TSH, 3rd generation with Free T4 reflex; Future    Need for hepatitis C screening test    Orders:    Hepatitis C Antibody; Future    Screening for HIV (human immunodeficiency virus)    Orders:    HIV 1/2 Antigen/Antibody (Fourth Generation) with Reflex Testing ([facility], [facility], or EXTERNAL LAB); Future          Depression Screening and Follow-up Plan: Patient's depression screening was positive with a PHQ-9 score of 17.   Patient with underlying depression and was advised to continue current medications as prescribed.       History of Present Illness   HPI  Radha is here today for follow up of depression and anxiety.   Has struggled with anxiety her whole life and depression over the past few years.   No SI/HI.   She has been on wellbutrin and SSRIs in the past, but would like to try an SNRI medication.   Currently takes 3 hydroxyzine a day.   Does see a therapist.   PHQ-2/9 Depression Screening    Little interest or pleasure in  "doing things: 3 - nearly every day  Feeling down, depressed, or hopeless: 3 - nearly every day  Trouble falling or staying asleep, or sleeping too much: 3 - nearly every day  Feeling tired or having little energy: 3 - nearly every day  Poor appetite or overeatin - not at all  Feeling bad about yourself - or that you are a failure or have let yourself or your family down: 2 - more than half the days  Trouble concentrating on things, such as reading the newspaper or watching television: 3 - nearly every day  Moving or speaking so slowly that other people could have noticed. Or the opposite - being so fidgety or restless that you have been moving around a lot more than usual: 0 - not at all  Thoughts that you would be better off dead, or of hurting yourself in some way: 0 - not at all  PHQ-9 Score: 17  PHQ-9 Interpretation: Moderately severe depression        Review of Systems   Constitutional: Negative.    HENT: Negative.     Eyes: Negative.    Respiratory: Negative.     Cardiovascular: Negative.    Gastrointestinal: Negative.    Endocrine: Negative.    Genitourinary: Negative.    Musculoskeletal: Negative.    Skin: Negative.    Allergic/Immunologic: Negative.    Neurological: Negative.    Hematological: Negative.    Psychiatric/Behavioral:  Positive for dysphoric mood. The patient is nervous/anxious.        Objective   BP 98/62   Pulse 62   Temp 97.5 °F (36.4 °C)   Resp 16   Ht 5' 7\" (1.702 m)   Wt 59 kg (130 lb)   BMI 20.36 kg/m²      Physical Exam  Constitutional:       General: She is not in acute distress.     Appearance: She is well-developed. She is not diaphoretic.   HENT:      Head: Normocephalic and atraumatic.     Cardiovascular:      Rate and Rhythm: Normal rate and regular rhythm.      Heart sounds: Normal heart sounds. No murmur heard.     No friction rub. No gallop.   Pulmonary:      Effort: Pulmonary effort is normal. No respiratory distress.      Breath sounds: Normal breath sounds. No " wheezing or rales.   Chest:      Chest wall: No tenderness.     Musculoskeletal:         General: No deformity. Normal range of motion.      Cervical back: Normal range of motion and neck supple.     Skin:     General: Skin is warm and dry.     Neurological:      Mental Status: She is alert and oriented to person, place, and time.     Psychiatric:         Behavior: Behavior normal.         Thought Content: Thought content normal.         Judgment: Judgment normal.

## 2025-07-23 NOTE — ASSESSMENT & PLAN NOTE
Depression Screening Follow-up Plan: Patient's depression screening was positive with a PHQ-9 score of 17. Patient with underlying depression and was advised to continue current medications as prescribed.  Will start cymbalta. Follow up in 6 weeks.   Orders:    DULoxetine (CYMBALTA) 20 mg capsule; Take 1 capsule (20 mg total) by mouth daily